# Patient Record
Sex: MALE | Race: WHITE | NOT HISPANIC OR LATINO | Employment: OTHER | URBAN - METROPOLITAN AREA
[De-identification: names, ages, dates, MRNs, and addresses within clinical notes are randomized per-mention and may not be internally consistent; named-entity substitution may affect disease eponyms.]

---

## 2018-07-05 ENCOUNTER — HOSPITAL ENCOUNTER (EMERGENCY)
Facility: HOSPITAL | Age: 63
Discharge: HOME | End: 2018-07-05
Attending: EMERGENCY MEDICINE
Payer: COMMERCIAL

## 2018-07-05 VITALS
TEMPERATURE: 98.7 F | SYSTOLIC BLOOD PRESSURE: 122 MMHG | BODY MASS INDEX: 27.99 KG/M2 | HEIGHT: 69 IN | DIASTOLIC BLOOD PRESSURE: 62 MMHG | WEIGHT: 189 LBS | HEART RATE: 61 BPM | OXYGEN SATURATION: 98 % | RESPIRATION RATE: 14 BRPM

## 2018-07-05 DIAGNOSIS — R31.21 ASYMPTOMATIC MICROSCOPIC HEMATURIA: ICD-10-CM

## 2018-07-05 DIAGNOSIS — N28.9 MILD RENAL INSUFFICIENCY: Primary | ICD-10-CM

## 2018-07-05 LAB
ANION GAP SERPL CALC-SCNC: 8 MEQ/L (ref 3–15)
BACTERIA URNS QL MICRO: ABNORMAL /UL
BILIRUB UR QL STRIP.AUTO: NEGATIVE MG/DL
BUN SERPL-MCNC: 23 MG/DL (ref 8–20)
CALCIUM SERPL-MCNC: 9.7 MG/DL (ref 8.9–10.3)
CHLORIDE SERPL-SCNC: 101 MMOL/L (ref 98–109)
CLARITY UR REFRACT.AUTO: CLEAR
CO2 SERPL-SCNC: 26 MMOL/L (ref 22–32)
COLOR UR AUTO: YELLOW
CREAT SERPL-MCNC: 1.7 MG/DL (ref 0.8–1.3)
GFR SERPL CREATININE-BSD FRML MDRD: 41 ML/MIN/1.73M*2
GLUCOSE SERPL-MCNC: 110 MG/DL (ref 70–99)
GLUCOSE UR STRIP.AUTO-MCNC: NEGATIVE MG/DL
HGB UR QL STRIP.AUTO: 2
HYALINE CASTS #/AREA URNS LPF: ABNORMAL /LPF
KETONES UR STRIP.AUTO-MCNC: NEGATIVE MG/DL
LEUKOCYTE ESTERASE UR QL STRIP.AUTO: NEGATIVE
NITRITE UR QL STRIP.AUTO: NEGATIVE
PH UR STRIP.AUTO: 7 [PH]
POTASSIUM SERPL-SCNC: 4.5 MMOL/L (ref 3.6–5.1)
PROT UR QL STRIP.AUTO: NEGATIVE
RBC #/AREA URNS HPF: ABNORMAL /HPF
SODIUM SERPL-SCNC: 135 MMOL/L (ref 136–144)
SP GR UR REFRACT.AUTO: 1.01
SQUAMOUS URNS QL MICRO: ABNORMAL /HPF
UROBILINOGEN UR STRIP-ACNC: 0.2 EU/DL
WBC #/AREA URNS HPF: ABNORMAL /HPF

## 2018-07-05 PROCEDURE — 80048 BASIC METABOLIC PNL TOTAL CA: CPT | Performed by: EMERGENCY MEDICINE

## 2018-07-05 PROCEDURE — 81001 URINALYSIS AUTO W/SCOPE: CPT | Performed by: EMERGENCY MEDICINE

## 2018-07-05 PROCEDURE — 36415 COLL VENOUS BLD VENIPUNCTURE: CPT | Performed by: EMERGENCY MEDICINE

## 2018-07-05 PROCEDURE — 99283 EMERGENCY DEPT VISIT LOW MDM: CPT

## 2018-07-05 RX ORDER — ATORVASTATIN CALCIUM 80 MG/1
TABLET, FILM COATED ORAL
Refills: 0 | COMMUNITY
Start: 2018-06-13 | End: 2020-10-29 | Stop reason: ALTCHOICE

## 2018-07-05 RX ORDER — PANTOPRAZOLE SODIUM 40 MG/1
TABLET, DELAYED RELEASE ORAL
Refills: 4 | COMMUNITY
Start: 2018-06-05 | End: 2020-10-29 | Stop reason: ALTCHOICE

## 2018-07-05 RX ORDER — QUINAPRIL 40 MG/1
TABLET ORAL EVERY MORNING
Refills: 0 | COMMUNITY
Start: 2018-06-13 | End: 2022-03-14 | Stop reason: ALTCHOICE

## 2018-07-05 RX ORDER — FENOFIBRATE 145 MG/1
TABLET, FILM COATED ORAL
Refills: 1 | COMMUNITY
Start: 2018-06-05 | End: 2020-10-29 | Stop reason: ALTCHOICE

## 2018-07-05 RX ORDER — LATANOPROST 50 UG/ML
1 SOLUTION/ DROPS OPHTHALMIC NIGHTLY
Refills: 5 | COMMUNITY
Start: 2018-06-18

## 2018-07-05 ASSESSMENT — ENCOUNTER SYMPTOMS
HEADACHES: 0
ARTHRALGIAS: 0
COUGH: 0
JOINT SWELLING: 0
DIFFICULTY URINATING: 0
DYSURIA: 0
CHILLS: 0
PALPITATIONS: 0
WEAKNESS: 0
ABDOMINAL PAIN: 0
VOMITING: 0
NAUSEA: 0
SORE THROAT: 0
SHORTNESS OF BREATH: 0
FEVER: 0
DIARRHEA: 0

## 2018-07-05 NOTE — DISCHARGE INSTRUCTIONS
Don't take quinapril until you follow up with your PMD and they decide whether you need to have a different BP medication.

## 2018-07-05 NOTE — ED PROVIDER NOTES
HPI   62 y.o. male presents to the ED for evaluation of abnormal labs.  Pt reports he was sent to the ED from primary care due to abnormal labs of his urine. States that the labs indicated that his kidneys may be irritated. Notes that he takes baby asa. Pt also controls his BP with medications.  C/o mild gum bleeding.   Currently pt feels fine in ER and is without any other complaints.       PCP:    Chief Complaint   Patient presents with   • Labs Only     patient sent here due to abnormal labs per primary care          History provided by:  Patient       Patient History     No past medical history on file.    No past surgical history on file.    No family history on file.    Social History   Substance Use Topics   • Smoking status: Never Smoker   • Smokeless tobacco: Never Used   • Alcohol use Yes      Comment: socially       Systems Reviewed from Nursing Triage:          Review of Systems     Review of Systems   Constitutional: Negative for chills and fever.   HENT: Negative for congestion, ear pain and sore throat.    Respiratory: Negative for cough and shortness of breath.    Cardiovascular: Negative for chest pain, palpitations and leg swelling.   Gastrointestinal: Negative for abdominal pain, diarrhea, nausea and vomiting.   Endocrine: Negative for polyuria.   Genitourinary: Negative for decreased urine volume, difficulty urinating and dysuria.   Musculoskeletal: Negative for arthralgias and joint swelling.   Skin: Negative for rash.   Neurological: Negative for weakness and headaches.        Physical Exam     ED Triage Vitals [07/05/18 1109]   Temp Heart Rate Resp BP SpO2   37.3 °C (99.1 °F) 77 18 122/64 99 %      Temp Source Heart Rate Source Patient Position BP Location FiO2 (%) (Set)   Oral Monitor Sitting Left upper arm --       Pulse Ox %: 99 % (07/05/18 1212)  Pulse Ox Interpretation: Normal (07/05/18 1212)          Patient Vitals for the past 24 hrs:   BP Temp Temp src Pulse Resp SpO2 Height  "Weight   07/05/18 1422 117/69 - - (!) 58 14 97 % - -   07/05/18 1325 113/70 - - (!) 58 18 100 % - -   07/05/18 1109 122/64 37.3 °C (99.1 °F) Oral 77 18 99 % 1.76 m (5' 9.29\") 85.7 kg (189 lb)           Physical Exam   Constitutional: He is oriented to person, place, and time. He appears well-developed and well-nourished. No distress.   HENT:   Head: Normocephalic and atraumatic.   Left Ear: External ear normal.   Eyes: EOM and lids are normal. Pupils are equal, round, and reactive to light.   Neck: Normal range of motion.   Cardiovascular: Normal rate, regular rhythm and normal heart sounds.    Pulmonary/Chest: Effort normal.   Abdominal: Soft. There is no tenderness.   Neurological: He is alert and oriented to person, place, and time. He has normal strength.   Skin: Skin is warm and dry.   Nursing note and vitals reviewed.           Procedures    ED Course & MDM     Labs Reviewed   BASIC METABOLIC PANEL - Abnormal        Result Value    Sodium 135 (*)     BUN 23 (*)     Creatinine 1.7 (*)     Glucose 110 (*)     eGFR 41.0 (*)     Potassium 4.5      Chloride 101      CO2 26      Calcium 9.7      Anion Gap 8     UA REFLEX CULTURE (MACROSCOPIC) - Abnormal     Blood, Urine +2 (*)     Color, Urine Yellow      Clarity, Urine Clear      Specific Gravity, Urine 1.008      pH, Urine 7.0      Leukocyte Esterase Negative      Nitrite, Urine Negative      Protein, Urine Negative      Glucose, Urine Negative      Ketones, Urine Negative      Urobilinogen, Urine 0.2      Bilirubin, Urine Negative     UA MICROSCOPIC - Abnormal     RBC, Urine 5 TO 9 (*)     WBC, Urine 0 TO 3      Squamous Epithelial None Seen      Hyaline Cast None Seen      Bacteria, Urine None Seen     URINALYSIS REFLEX CULTURE    Narrative:     The following orders were created for panel order Urinalysis w/ reflex culture.  Procedure                               Abnormality         Status                     ---------                               -----------  "        ------                     UA Reflex to Culture (Mac...[[id number]]  Abnormal            Final result               UA Microscopic[[id number]]                Abnormal            Final result                 Please view results for these tests on the individual orders.       No orders to display           MDM         ED Course as of Jul 05 1541   u Jul 05, 2018   1257 62 y.o. male presents to the ED for evaluation of abnormal labs.  Plans to check labs.   [HP]   1301 Elevated creatinine, no old labs available to compare. Pt is asymptomatic. Potassium normal. Will instruct pt to hold ace-I until follow up with PMD Creatinine: (!) 1.7 [HB]      ED Course User Index  [HB] Ena Santana DO  [HP] Natalie Macario         Clinical Impressions as of Jul 05 1541   Mild renal insufficiency   Asymptomatic microscopic hematuria   Scribe Attestation  By signing my name below, INatalie, attest that this documentation has been prepared under the direction and in the presence of Ena Santana DO.  7/5/2018 3:41 PM    Provider Attestation  IEna, personally performed the services described in this documentation, as documented by the scribe in my presence, and it is both accurate and complete.  7/5/2018 3:41 PM         Natalie Macario  07/05/18 1258       Natalie Macario  07/05/18 1258       Ena Santana DO  07/05/18 1541

## 2020-04-06 ENCOUNTER — APPOINTMENT (OUTPATIENT)
Dept: URBAN - METROPOLITAN AREA CLINIC 200 | Age: 65
Setting detail: DERMATOLOGY
End: 2020-04-10

## 2020-04-06 DIAGNOSIS — L20.84 INTRINSIC (ALLERGIC) ECZEMA: ICD-10-CM

## 2020-04-06 PROCEDURE — OTHER COUNSELING: OTHER

## 2020-04-06 PROCEDURE — 99202 OFFICE O/P NEW SF 15 MIN: CPT | Mod: 95

## 2020-04-06 PROCEDURE — OTHER PRESCRIPTION: OTHER

## 2020-04-06 RX ORDER — TRIAMCINOLONE ACETONIDE 1 MG/G
CREAM TOPICAL
Qty: 1 | Refills: 8 | Status: ERX | COMMUNITY
Start: 2020-04-06

## 2020-04-06 ASSESSMENT — LOCATION SIMPLE DESCRIPTION DERM
LOCATION SIMPLE: LEFT PRETIBIAL REGION
LOCATION SIMPLE: ABDOMEN
LOCATION SIMPLE: RIGHT PRETIBIAL REGION
LOCATION SIMPLE: UPPER BACK

## 2020-04-06 ASSESSMENT — LOCATION ZONE DERM
LOCATION ZONE: TRUNK
LOCATION ZONE: LEG

## 2020-04-06 ASSESSMENT — LOCATION DETAILED DESCRIPTION DERM
LOCATION DETAILED: RIGHT RIB CAGE
LOCATION DETAILED: RIGHT LATERAL ABDOMEN
LOCATION DETAILED: RIGHT DISTAL PRETIBIAL REGION
LOCATION DETAILED: INFERIOR THORACIC SPINE
LOCATION DETAILED: LEFT DISTAL PRETIBIAL REGION

## 2020-04-06 ASSESSMENT — SEVERITY ASSESSMENT 2020: SEVERITY 2020: MODERATE

## 2020-04-06 ASSESSMENT — BSA RASH: BSA RASH: 15

## 2020-04-06 NOTE — HPI: RASH
What Type Of Note Output Would You Prefer (Optional)?: Bullet Format
How Severe Is Your Rash?: moderate
Is This A New Presentation, Or A Follow-Up?: Rash
Additional History: Patient has allergic rhinitis, occurs primarily in winter, also has allergies to down

## 2020-04-06 NOTE — PROCEDURE: COUNSELING
Patient Specific Counseling (Will Not Stick From Patient To Patient): stop using Dial soap.  Switch to Dove, cetaphil, olay, caress, tone, diego.  Takes up to 3 showers daily.  Recommended 1 shorter cooler shower daily.
Detail Level: Zone

## 2020-08-06 ENCOUNTER — OFFICE VISIT (OUTPATIENT)
Dept: SURGERY | Facility: CLINIC | Age: 65
End: 2020-08-06
Payer: COMMERCIAL

## 2020-08-06 VITALS — BODY MASS INDEX: 26.77 KG/M2 | WEIGHT: 187 LBS | HEIGHT: 70 IN

## 2020-08-06 DIAGNOSIS — K40.90 REDUCIBLE RIGHT INGUINAL HERNIA: ICD-10-CM

## 2020-08-06 DIAGNOSIS — R10.32 LEFT GROIN PAIN: Primary | ICD-10-CM

## 2020-08-06 PROCEDURE — 99203 OFFICE O/P NEW LOW 30 MIN: CPT | Performed by: SURGERY

## 2020-08-06 RX ORDER — SIMVASTATIN 40 MG/1
50 TABLET, FILM COATED ORAL NIGHTLY
COMMUNITY
End: 2020-10-29 | Stop reason: ALTCHOICE

## 2020-08-06 ASSESSMENT — ENCOUNTER SYMPTOMS
PALPITATIONS: 0
HEMATURIA: 0
ARTHRALGIAS: 1
BRUISES/BLEEDS EASILY: 0
FEVER: 0
VOMITING: 0
SHORTNESS OF BREATH: 0
ABDOMINAL PAIN: 1
DYSURIA: 0
CONSTIPATION: 0
APPETITE CHANGE: 0
CHEST TIGHTNESS: 0
MYALGIAS: 0
BACK PAIN: 0
BLOOD IN STOOL: 0
WHEEZING: 0
NAUSEA: 0
DIARRHEA: 0
COUGH: 0
ABDOMINAL DISTENTION: 0
ACTIVITY CHANGE: 0

## 2020-08-06 NOTE — PROGRESS NOTES
"General Surgery H&P  Patient: Julian Vazquez  MRN: [id number]  1955    Visit Date: 8/6/2020  Encounter Provider: Issac Pablo  Referring Provider: Clara Yoo DO    Subjective   Consult and Hernia      Julian Vazquez is a 64 y.o. male who was seen in consultation at the request of referring physician for management recommendations.  Julian AVILA presents to the office with left inguinal discomfort directly in the left inguinal crease.  Julian has not noticed any left inguinal masses.  Julian denies other abdominal pain, nausea, vomiting, diarrhea, constipation, melena or blood per rectum.  He has no dysuria or hematuria, and denies back or flank pain.  Upon questioning Julian does admit to \"clicking\" in the left hip.  Julian denies cough, wheezing or shortness of breath and has no chest pain on exertion or palpitations.  He has no history of easy bruising or prolonged bleeding.  Julian has had no skin eruptions or rash.    Medical History:   Past Medical History:   Diagnosis Date   • Lipid disorder        Surgical History:   Past Surgical History:   Procedure Laterality Date   • HAND SURGERY         Social History:   Social History     Social History Narrative   • Not on file       Family History: History reviewed. No pertinent family history.    Allergies: Patient has no known allergies.    Current Medications:  •  quinapriL  •  simvastatin  •  atorvastatin  •  fenofibrate  •  latanoprost  •  pantoprazole    Review of Systems  Review of Systems   Constitutional: Negative for activity change, appetite change and fever.   HENT: Negative for nosebleeds.    Respiratory: Negative for cough, chest tightness, shortness of breath and wheezing.    Cardiovascular: Negative for chest pain and palpitations.   Gastrointestinal: Positive for abdominal pain. Negative for abdominal distention, blood in stool, constipation, diarrhea, nausea and vomiting.   Genitourinary: Negative for dysuria, hematuria, scrotal swelling " and testicular pain.   Musculoskeletal: Positive for arthralgias. Negative for back pain and myalgias.   Skin: Negative for pallor and rash.   Hematological: Does not bruise/bleed easily.       Objective     Physicial Exam  Physical Exam   Constitutional: He is oriented to person, place, and time. He appears well-developed and well-nourished. No distress.   Eyes: Conjunctivae are normal.   Pulmonary/Chest: Effort normal. No respiratory distress.   Abdominal: Soft. He exhibits no distension. There is no tenderness. There is no guarding. A hernia is present.   There is no palpable left inguinal hernia.  Julian does have a nontender, reducible right inguinal hernia.   Musculoskeletal: Normal range of motion. He exhibits no tenderness.   Neurological: He is alert and oriented to person, place, and time.   Skin: Skin is warm and dry. No rash noted. He is not diaphoretic. No erythema. No pallor.   Psychiatric: He has a normal mood and affect. His behavior is normal. Judgment and thought content normal.   Nursing note and vitals reviewed.        Labs  No results found for: WBC, HGB, HCT, MCV, PLT    Lab Results   Component Value Date    GLUCOSE 110 (H) 07/05/2018    CALCIUM 9.7 07/05/2018     (L) 07/05/2018    K 4.5 07/05/2018    CO2 26 07/05/2018     07/05/2018    BUN 23 (H) 07/05/2018    CREATININE 1.7 (H) 07/05/2018       No results found for: ALT, AST, GGT, ALKPHOS, BILITOT      Imaging  Not applicable    Assessment       Left groin pain  Reducible right inguinal hernia          Plan   I have asked Julian to have a CT of his abdomen and pelvis without p.o. or IV contrast.  I will call him with the report.  In addition, I have asked Julian to visit with orthopedic surgery and I have referred him to the [facility].  I did explain to Julian the procedure of repairing his right inguinal hernia, and bilateral inguinal hernia repair, preferably robotically.  He did sign an operative permit here today in the office.   No date has been scheduled.Julian AVILA expressed understanding of the indications for the procedure, the procedure which is planned, the alternatives, including doing nothing, and the risks and complications of each.  I did answer all of his questions to his satisfaction.        Issac Pablo MD

## 2020-08-19 ENCOUNTER — TELEPHONE (OUTPATIENT)
Dept: SURGERY | Facility: CLINIC | Age: 65
End: 2020-08-19

## 2020-08-19 DIAGNOSIS — R10.32 LEFT GROIN PAIN: Primary | ICD-10-CM

## 2020-08-19 DIAGNOSIS — K40.90 REDUCIBLE RIGHT INGUINAL HERNIA: ICD-10-CM

## 2020-08-19 NOTE — TELEPHONE ENCOUNTER
Left message for patient, Insurance denied CT stating not medically necessary, ultrasound of scrotum needs to be done prior for them to reconsider.

## 2020-08-27 ENCOUNTER — HOSPITAL ENCOUNTER (OUTPATIENT)
Dept: RADIOLOGY | Facility: HOSPITAL | Age: 65
Discharge: HOME | End: 2020-08-27
Attending: SURGERY
Payer: COMMERCIAL

## 2020-08-27 ENCOUNTER — TELEPHONE (OUTPATIENT)
Dept: SURGERY | Facility: CLINIC | Age: 65
End: 2020-08-27

## 2020-08-27 DIAGNOSIS — K40.90 REDUCIBLE RIGHT INGUINAL HERNIA: ICD-10-CM

## 2020-08-27 DIAGNOSIS — R10.32 LEFT GROIN PAIN: ICD-10-CM

## 2020-08-27 PROCEDURE — 76870 US EXAM SCROTUM: CPT

## 2020-08-27 NOTE — TELEPHONE ENCOUNTER
Left message for patient regarding if he had a chance to get ultrasound of scrotum done.  Insurance will not approve CT until ultrasound is done.

## 2020-09-01 ENCOUNTER — TELEPHONE (OUTPATIENT)
Dept: SURGERY | Facility: CLINIC | Age: 65
End: 2020-09-01

## 2020-09-01 NOTE — TELEPHONE ENCOUNTER
----- Message from Issac Pablo MD sent at 8/27/2020  3:59 PM EDT -----  Please send copy to PCP.  Please tell Julian that ultrasound of the scrotum is normal.  Please order the CT examination that I had ordered before.

## 2020-09-08 ENCOUNTER — HOSPITAL ENCOUNTER (OUTPATIENT)
Dept: RADIOLOGY | Facility: HOSPITAL | Age: 65
Discharge: HOME | End: 2020-09-08
Attending: SURGERY
Payer: COMMERCIAL

## 2020-09-08 DIAGNOSIS — R10.32 LEFT GROIN PAIN: ICD-10-CM

## 2020-09-08 DIAGNOSIS — K40.90 REDUCIBLE RIGHT INGUINAL HERNIA: ICD-10-CM

## 2020-09-08 PROCEDURE — 74176 CT ABD & PELVIS W/O CONTRAST: CPT

## 2020-09-09 ENCOUNTER — TELEPHONE (OUTPATIENT)
Dept: SURGERY | Facility: CLINIC | Age: 65
End: 2020-09-09

## 2020-09-09 NOTE — TELEPHONE ENCOUNTER
----- Message from Issac Pablo MD sent at 9/8/2020  6:08 PM EDT -----  Please send copy to PCP. Please tell Julian that he has bilateral inguinal hernias, we will repair at the same time. Please schedule accordingly      Called patient to let him know . Annette will be calling patient to schedule

## 2020-09-15 PROBLEM — K40.20 NON-RECURRENT BILATERAL INGUINAL HERNIA WITHOUT OBSTRUCTION OR GANGRENE: Status: ACTIVE | Noted: 2020-09-15

## 2020-10-29 RX ORDER — ATORVASTATIN CALCIUM 40 MG/1
40 TABLET, FILM COATED ORAL NIGHTLY
COMMUNITY
End: 2022-03-14 | Stop reason: ENTERED-IN-ERROR

## 2020-10-29 SDOH — HEALTH STABILITY: MENTAL HEALTH: HOW OFTEN DO YOU HAVE A DRINK CONTAINING ALCOHOL?: NEVER

## 2020-10-29 ASSESSMENT — PAIN SCALES - GENERAL: PAINLEVEL: 2

## 2020-11-04 ENCOUNTER — APPOINTMENT (OUTPATIENT)
Dept: PREADMISSION TESTING | Facility: HOSPITAL | Age: 65
End: 2020-11-04
Attending: SURGERY
Payer: MEDICARE

## 2020-11-04 ENCOUNTER — TELEPHONE (OUTPATIENT)
Dept: SURGERY | Facility: CLINIC | Age: 65
End: 2020-11-04

## 2020-11-04 VITALS
BODY MASS INDEX: 26.48 KG/M2 | HEART RATE: 64 BPM | WEIGHT: 185 LBS | OXYGEN SATURATION: 100 % | TEMPERATURE: 98.5 F | DIASTOLIC BLOOD PRESSURE: 71 MMHG | RESPIRATION RATE: 18 BRPM | SYSTOLIC BLOOD PRESSURE: 152 MMHG | HEIGHT: 70 IN

## 2020-11-04 DIAGNOSIS — Z01.818 PREOP TESTING: Primary | ICD-10-CM

## 2020-11-04 DIAGNOSIS — K40.20 NON-RECURRENT BILATERAL INGUINAL HERNIA WITHOUT OBSTRUCTION OR GANGRENE: ICD-10-CM

## 2020-11-04 LAB
ANION GAP SERPL CALC-SCNC: 11 MEQ/L (ref 3–15)
BASOPHILS # BLD: 0.05 K/UL (ref 0.01–0.1)
BASOPHILS NFR BLD: 1 %
BUN SERPL-MCNC: 12 MG/DL (ref 8–20)
CALCIUM SERPL-MCNC: 9.2 MG/DL (ref 8.9–10.3)
CHLORIDE SERPL-SCNC: 104 MEQ/L (ref 98–109)
CO2 SERPL-SCNC: 24 MEQ/L (ref 22–32)
CREAT SERPL-MCNC: 0.9 MG/DL (ref 0.8–1.3)
DIFFERENTIAL METHOD BLD: ABNORMAL
EOSINOPHIL # BLD: 0.12 K/UL (ref 0.04–0.54)
EOSINOPHIL NFR BLD: 2.4 %
ERYTHROCYTE [DISTWIDTH] IN BLOOD BY AUTOMATED COUNT: 12.1 % (ref 11.6–14.4)
GFR SERPL CREATININE-BSD FRML MDRD: >60 ML/MIN/1.73M*2
GLUCOSE SERPL-MCNC: 105 MG/DL (ref 70–99)
HCT VFR BLDCO AUTO: 42.7 % (ref 40.1–51)
HGB BLD-MCNC: 14.5 G/DL (ref 13.7–17.5)
IMM GRANULOCYTES # BLD AUTO: 0.01 K/UL (ref 0–0.08)
IMM GRANULOCYTES NFR BLD AUTO: 0.2 %
LYMPHOCYTES # BLD: 1.75 K/UL (ref 1.2–3.5)
LYMPHOCYTES NFR BLD: 34.9 %
MCH RBC QN AUTO: 32.8 PG (ref 28–33.2)
MCHC RBC AUTO-ENTMCNC: 34 G/DL (ref 32.2–36.5)
MCV RBC AUTO: 96.6 FL (ref 83–98)
MONOCYTES # BLD: 0.4 K/UL (ref 0.3–1)
MONOCYTES NFR BLD: 8 %
NEUTROPHILS # BLD: 2.69 K/UL (ref 1.7–7)
NEUTS SEG NFR BLD: 53.5 %
NRBC BLD-RTO: 0 %
PDW BLD AUTO: 11.1 FL (ref 9.4–12.4)
PLATELET # BLD AUTO: 242 K/UL (ref 150–350)
POTASSIUM SERPL-SCNC: 4.5 MEQ/L (ref 3.6–5.1)
RBC # BLD AUTO: 4.42 M/UL (ref 4.5–5.8)
SODIUM SERPL-SCNC: 139 MEQ/L (ref 136–144)
WBC # BLD AUTO: 5.02 K/UL (ref 3.8–10.5)

## 2020-11-04 PROCEDURE — 93005 ELECTROCARDIOGRAM TRACING: CPT

## 2020-11-04 PROCEDURE — 36415 COLL VENOUS BLD VENIPUNCTURE: CPT

## 2020-11-04 PROCEDURE — U0003 INFECTIOUS AGENT DETECTION BY NUCLEIC ACID (DNA OR RNA); SEVERE ACUTE RESPIRATORY SYNDROME CORONAVIRUS 2 (SARS-COV-2) (CORONAVIRUS DISEASE [COVID-19]), AMPLIFIED PROBE TECHNIQUE, MAKING USE OF HIGH THROUGHPUT TECHNOLOGIES AS DESCRIBED BY CMS-2020-01-R: HCPCS

## 2020-11-04 PROCEDURE — 85025 COMPLETE CBC W/AUTO DIFF WBC: CPT

## 2020-11-04 PROCEDURE — 80048 BASIC METABOLIC PNL TOTAL CA: CPT

## 2020-11-04 ASSESSMENT — PAIN SCALES - GENERAL: PAINLEVEL: 0-NO PAIN
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
## 2020-11-04 NOTE — PRE-PROCEDURE INSTRUCTIONS
1. We will call you between 3pm to 7pm 1 day before the date of your surgery to determine that arrival time for your procedure.   2. Please report to outpatient unit:2nd floor as directed on the day of your procedure.   3. Please follow the following fasting guidelines:   · No solids to eat or drink 8 hours prior to arrival. Clear liquids up to two hours prior to arrival-water and black coffee/tea;no sugar or cream   4. Early on the morning of the procedure please do not take medication  Please quarantine until surgery.  Please wear a cloth mask to enter the hospital   5. Other Instructions: body wash as directed   6. If you develop a cold, cough, fever, rash, or other symptom prior to the data of the procedure, please report it to your physician immediately.   7. If you need to cancel the procedure for any reason, please contact your physician or call the unit listed above.   8. Make arrangements to have someone drive you home from the procedure. If you have not arranged for transportation home, your surgery may be cancelled.    9. You may not take public transportation unless accompanied by a responsible person.   10. You may not drive a car or operate complex or potentially dangerous machinery for 24 hours following anesthesia and/or sedation.   11. If it is medically necessary for you to have a longer stay, you will be informed as soon as the decision is made.   12. Do not wear or being anything of value to the hospital including jewelry of any kind. Do not wear make-up or contact lenses. DO bring your glasses and hearing aid.   13. Dress in comfortable clothes.   14.  If instructed, please bring a copy of your Advanced Directive (Living Will/Durable Power of ) on the day of your procedure.      Pre operative instructions given as per protocol.  Form explained by:      I have read and understand the above information. I have had sufficient opportunity to ask questions I might have and they have been  answered to my satisfaction. I agree to comply with the Patient Responsibilities listed above and have received a copy of this form.

## 2020-11-05 ENCOUNTER — ANESTHESIA EVENT (OUTPATIENT)
Dept: OPERATING ROOM | Facility: HOSPITAL | Age: 65
Setting detail: HOSPITAL OUTPATIENT SURGERY
End: 2020-11-05
Payer: MEDICARE

## 2020-11-05 LAB
ATRIAL RATE: 63
P AXIS: 35
PR INTERVAL: 184
QRS DURATION: 98
QT INTERVAL: 456
QTC CALCULATION(BAZETT): 466
R AXIS: -65
T WAVE AXIS: 34
VENTRICULAR RATE: 63

## 2020-11-05 PROCEDURE — 93010 ELECTROCARDIOGRAM REPORT: CPT | Performed by: INTERNAL MEDICINE

## 2020-11-06 LAB — SARS-COV-2 RNA RESP QL NAA+PROBE: NOT DETECTED

## 2020-11-09 ENCOUNTER — HOSPITAL ENCOUNTER (OUTPATIENT)
Facility: HOSPITAL | Age: 65
Setting detail: HOSPITAL OUTPATIENT SURGERY
Discharge: HOME | End: 2020-11-09
Attending: SURGERY | Admitting: SURGERY
Payer: MEDICARE

## 2020-11-09 ENCOUNTER — ANESTHESIA (OUTPATIENT)
Dept: OPERATING ROOM | Facility: HOSPITAL | Age: 65
Setting detail: HOSPITAL OUTPATIENT SURGERY
End: 2020-11-09
Payer: MEDICARE

## 2020-11-09 VITALS
WEIGHT: 185 LBS | HEIGHT: 70 IN | HEART RATE: 68 BPM | SYSTOLIC BLOOD PRESSURE: 151 MMHG | OXYGEN SATURATION: 98 % | BODY MASS INDEX: 26.48 KG/M2 | RESPIRATION RATE: 16 BRPM | DIASTOLIC BLOOD PRESSURE: 62 MMHG | TEMPERATURE: 97.2 F

## 2020-11-09 PROCEDURE — 63600000 HC DRUGS/DETAIL CODE: Performed by: SURGERY

## 2020-11-09 PROCEDURE — 71000011 HC PACU PHASE 1 EA ADDL MIN: Performed by: SURGERY

## 2020-11-09 PROCEDURE — 0YU54JZ SUPPLEMENT RIGHT INGUINAL REGION WITH SYNTHETIC SUBSTITUTE, PERCUTANEOUS ENDOSCOPIC APPROACH: ICD-10-PCS | Performed by: SURGERY

## 2020-11-09 PROCEDURE — 49650 LAP ING HERNIA REPAIR INIT: CPT | Mod: RT | Performed by: SURGERY

## 2020-11-09 PROCEDURE — 8E0W4CZ ROBOTIC ASSISTED PROCEDURE OF TRUNK REGION, PERCUTANEOUS ENDOSCOPIC APPROACH: ICD-10-PCS | Performed by: SURGERY

## 2020-11-09 PROCEDURE — 71000012 HC PACU PHASE 2 EA ADDL MIN: Performed by: SURGERY

## 2020-11-09 PROCEDURE — C1781 MESH (IMPLANTABLE): HCPCS | Performed by: SURGERY

## 2020-11-09 PROCEDURE — 36000006 HC OR LEVEL 6 INITIAL 30MIN: Performed by: SURGERY

## 2020-11-09 PROCEDURE — 27200000 HC STERILE SUPPLY: Performed by: SURGERY

## 2020-11-09 PROCEDURE — 37000001 HC ANESTHESIA GENERAL: Performed by: SURGERY

## 2020-11-09 PROCEDURE — 71000001 HC PACU PHASE 1 INITIAL 30MIN: Performed by: SURGERY

## 2020-11-09 PROCEDURE — 200200 PR NO CHARGE: Performed by: SURGERY

## 2020-11-09 PROCEDURE — 25000000 HC PHARMACY GENERAL: Performed by: SURGERY

## 2020-11-09 PROCEDURE — 63600000 HC DRUGS/DETAIL CODE: Mod: JW | Performed by: NURSE ANESTHETIST, CERTIFIED REGISTERED

## 2020-11-09 PROCEDURE — 71000002 HC PACU PHASE 2 INITIAL 30MIN: Performed by: SURGERY

## 2020-11-09 PROCEDURE — 25000000 HC PHARMACY GENERAL: Performed by: NURSE ANESTHETIST, CERTIFIED REGISTERED

## 2020-11-09 PROCEDURE — 36000016 HC OR LEVEL 6 EA ADDL MIN: Performed by: SURGERY

## 2020-11-09 DEVICE — LAPROSCOPIC PROGRIP RIGHT MESH 10X15: Type: IMPLANTABLE DEVICE | Site: INGUINAL | Status: FUNCTIONAL

## 2020-11-09 RX ORDER — PROPOFOL 10 MG/ML
INJECTION, EMULSION INTRAVENOUS AS NEEDED
Status: DISCONTINUED | OUTPATIENT
Start: 2020-11-09 | End: 2020-11-09 | Stop reason: SURG

## 2020-11-09 RX ORDER — ACETAMINOPHEN 325 MG/1
650 TABLET ORAL ONCE AS NEEDED
Status: CANCELLED | OUTPATIENT
Start: 2020-11-09

## 2020-11-09 RX ORDER — DEXAMETHASONE SODIUM PHOSPHATE 4 MG/ML
INJECTION, SOLUTION INTRA-ARTICULAR; INTRALESIONAL; INTRAMUSCULAR; INTRAVENOUS; SOFT TISSUE AS NEEDED
Status: DISCONTINUED | OUTPATIENT
Start: 2020-11-09 | End: 2020-11-09 | Stop reason: SURG

## 2020-11-09 RX ORDER — SODIUM CHLORIDE, SODIUM LACTATE, POTASSIUM CHLORIDE, CALCIUM CHLORIDE 600; 310; 30; 20 MG/100ML; MG/100ML; MG/100ML; MG/100ML
INJECTION, SOLUTION INTRAVENOUS CONTINUOUS
Status: CANCELLED | OUTPATIENT
Start: 2020-11-09 | End: 2020-11-09

## 2020-11-09 RX ORDER — ONDANSETRON HYDROCHLORIDE 2 MG/ML
4 INJECTION, SOLUTION INTRAVENOUS
Status: CANCELLED | OUTPATIENT
Start: 2020-11-09

## 2020-11-09 RX ORDER — OXYCODONE AND ACETAMINOPHEN 5; 325 MG/1; MG/1
1 TABLET ORAL EVERY 4 HOURS PRN
Qty: 6 TABLET | Refills: 0 | Status: SHIPPED | OUTPATIENT
Start: 2020-11-09 | End: 2022-03-14 | Stop reason: ALTCHOICE

## 2020-11-09 RX ORDER — HYDROMORPHONE HYDROCHLORIDE 1 MG/ML
0.5 INJECTION, SOLUTION INTRAMUSCULAR; INTRAVENOUS; SUBCUTANEOUS
Status: CANCELLED | OUTPATIENT
Start: 2020-11-09

## 2020-11-09 RX ORDER — ONDANSETRON HYDROCHLORIDE 2 MG/ML
INJECTION, SOLUTION INTRAVENOUS AS NEEDED
Status: DISCONTINUED | OUTPATIENT
Start: 2020-11-09 | End: 2020-11-09 | Stop reason: SURG

## 2020-11-09 RX ORDER — LIDOCAINE HCL/PF 100 MG/5ML
SYRINGE (ML) INTRAVENOUS AS NEEDED
Status: DISCONTINUED | OUTPATIENT
Start: 2020-11-09 | End: 2020-11-09 | Stop reason: SURG

## 2020-11-09 RX ORDER — CEFAZOLIN SODIUM/WATER 2 G/20 ML
2 SYRINGE (ML) INTRAVENOUS
Status: COMPLETED | OUTPATIENT
Start: 2020-11-09 | End: 2020-11-09

## 2020-11-09 RX ORDER — KETOROLAC TROMETHAMINE 30 MG/ML
INJECTION, SOLUTION INTRAMUSCULAR; INTRAVENOUS AS NEEDED
Status: DISCONTINUED | OUTPATIENT
Start: 2020-11-09 | End: 2020-11-09 | Stop reason: SURG

## 2020-11-09 RX ORDER — OXYCODONE HYDROCHLORIDE 5 MG/1
5 TABLET ORAL ONCE AS NEEDED
Status: CANCELLED | OUTPATIENT
Start: 2020-11-09

## 2020-11-09 RX ORDER — BUPIVACAINE HYDROCHLORIDE 2.5 MG/ML
INJECTION, SOLUTION EPIDURAL; INFILTRATION; INTRACAUDAL AS NEEDED
Status: DISCONTINUED | OUTPATIENT
Start: 2020-11-09 | End: 2020-11-09 | Stop reason: HOSPADM

## 2020-11-09 RX ORDER — SODIUM CHLORIDE, SODIUM LACTATE, POTASSIUM CHLORIDE, CALCIUM CHLORIDE 600; 310; 30; 20 MG/100ML; MG/100ML; MG/100ML; MG/100ML
INJECTION, SOLUTION INTRAVENOUS CONTINUOUS PRN
Status: DISCONTINUED | OUTPATIENT
Start: 2020-11-09 | End: 2020-11-09 | Stop reason: SURG

## 2020-11-09 RX ORDER — FENTANYL CITRATE 50 UG/ML
50 INJECTION, SOLUTION INTRAMUSCULAR; INTRAVENOUS
Status: CANCELLED | OUTPATIENT
Start: 2020-11-09

## 2020-11-09 RX ORDER — SODIUM CHLORIDE, SODIUM LACTATE, POTASSIUM CHLORIDE, CALCIUM CHLORIDE 600; 310; 30; 20 MG/100ML; MG/100ML; MG/100ML; MG/100ML
INJECTION, SOLUTION INTRAVENOUS CONTINUOUS
Status: DISCONTINUED | OUTPATIENT
Start: 2020-11-09 | End: 2020-11-09 | Stop reason: HOSPADM

## 2020-11-09 RX ORDER — MIDAZOLAM HYDROCHLORIDE 2 MG/2ML
INJECTION, SOLUTION INTRAMUSCULAR; INTRAVENOUS AS NEEDED
Status: DISCONTINUED | OUTPATIENT
Start: 2020-11-09 | End: 2020-11-09 | Stop reason: SURG

## 2020-11-09 RX ORDER — FENTANYL CITRATE 50 UG/ML
INJECTION, SOLUTION INTRAMUSCULAR; INTRAVENOUS AS NEEDED
Status: DISCONTINUED | OUTPATIENT
Start: 2020-11-09 | End: 2020-11-09 | Stop reason: SURG

## 2020-11-09 RX ORDER — SODIUM CHLORIDE 9 MG/ML
125 INJECTION, SOLUTION INTRAVENOUS CONTINUOUS
Status: DISCONTINUED | OUTPATIENT
Start: 2020-11-09 | End: 2020-11-09 | Stop reason: HOSPADM

## 2020-11-09 RX ORDER — ROCURONIUM BROMIDE 10 MG/ML
INJECTION, SOLUTION INTRAVENOUS AS NEEDED
Status: DISCONTINUED | OUTPATIENT
Start: 2020-11-09 | End: 2020-11-09 | Stop reason: SURG

## 2020-11-09 RX ADMIN — ROCURONIUM BROMIDE 10 MG: 10 INJECTION, SOLUTION INTRAVENOUS at 11:16

## 2020-11-09 RX ADMIN — ONDANSETRON HYDROCHLORIDE 4 MG: 2 INJECTION, SOLUTION INTRAMUSCULAR; INTRAVENOUS at 10:15

## 2020-11-09 RX ADMIN — ROCURONIUM BROMIDE 50 MG: 10 INJECTION, SOLUTION INTRAVENOUS at 10:02

## 2020-11-09 RX ADMIN — SUGAMMADEX 340 MG: 100 INJECTION, SOLUTION INTRAVENOUS at 11:32

## 2020-11-09 RX ADMIN — LIDOCAINE HYDROCHLORIDE 5 ML: 20 INJECTION, SOLUTION INTRAVENOUS at 10:02

## 2020-11-09 RX ADMIN — Medication 2 G: at 10:06

## 2020-11-09 RX ADMIN — ROCURONIUM BROMIDE 20 MG: 10 INJECTION, SOLUTION INTRAVENOUS at 10:21

## 2020-11-09 RX ADMIN — DEXAMETHASONE SODIUM PHOSPHATE 4 MG: 4 INJECTION, SOLUTION INTRA-ARTICULAR; INTRALESIONAL; INTRAMUSCULAR; INTRAVENOUS; SOFT TISSUE at 10:15

## 2020-11-09 RX ADMIN — ROCURONIUM BROMIDE 20 MG: 10 INJECTION, SOLUTION INTRAVENOUS at 10:45

## 2020-11-09 RX ADMIN — SODIUM CHLORIDE, SODIUM LACTATE, POTASSIUM CHLORIDE, CALCIUM CHLORIDE: 600; 310; 30; 20 INJECTION, SOLUTION INTRAVENOUS at 09:58

## 2020-11-09 RX ADMIN — MIDAZOLAM HYDROCHLORIDE 2 MG: 1 INJECTION, SOLUTION INTRAMUSCULAR; INTRAVENOUS at 09:53

## 2020-11-09 RX ADMIN — FENTANYL CITRATE 100 MCG: 50 INJECTION, SOLUTION INTRAMUSCULAR; INTRAVENOUS at 10:36

## 2020-11-09 RX ADMIN — SODIUM CHLORIDE, POTASSIUM CHLORIDE, SODIUM LACTATE AND CALCIUM CHLORIDE: 600; 310; 30; 20 INJECTION, SOLUTION INTRAVENOUS at 09:22

## 2020-11-09 RX ADMIN — PROPOFOL INJECTABLE EMULSION 200 MG: 10 INJECTION, EMULSION INTRAVENOUS at 10:02

## 2020-11-09 RX ADMIN — FENTANYL CITRATE 150 MCG: 50 INJECTION, SOLUTION INTRAMUSCULAR; INTRAVENOUS at 10:02

## 2020-11-09 RX ADMIN — KETOROLAC TROMETHAMINE 30 MG: 30 INJECTION, SOLUTION INTRAMUSCULAR at 11:30

## 2020-11-09 ASSESSMENT — PAIN SCALES - GENERAL: PAIN_LEVEL: 2

## 2020-11-09 ASSESSMENT — PAIN - FUNCTIONAL ASSESSMENT: PAIN_FUNCTIONAL_ASSESSMENT: NO/DENIES PAIN

## 2020-11-09 NOTE — ANESTHESIOLOGIST PRE-PROCEDURE ATTESTATION
Pre-Procedure Patient Identification:  I am the Primary Anesthesiologist and have identified the patient on 11/09/20 at 9:31 AM.   I have confirmed the following procedure(s) HERNIA INGUINAL LAPAROSCOPIC ROBOTIC (B) will be performed by the following surgeon/proceduralist Issac Pablo MD.

## 2020-11-09 NOTE — ANESTHESIA PROCEDURE NOTES
Airway  Urgency: elective    Start Time: 11/9/2020 10:05 AM  Stop Time: 11/9/2020 11:47 AM    Airway not difficult    General Information and Staff    Patient location during procedure: OR  Anesthesiologist: Gaurav Holt MD  Resident/CRNA: Judit Soria CRNA  Performed: resident/CRNA     Indications and Patient Condition  Indications for airway management: anesthesia  Sedation level: general  Preoxygenated: yes  Patient position: sniffing  Mask difficulty assessment: 1 - vent by mask    Final Airway Details  Final airway type: endotracheal airway      Successful airway: ETT  Cuffed: yes   Successful intubation technique: direct laryngoscopy  Facilitating devices/methods: intubating stylet  Endotracheal tube insertion site: oral  Blade: Haley  Blade size: #4  ETT size (mm): 8.0  Cormack-Lehane Classification: grade I - full view of glottis  Placement verified by: chest auscultation and capnometry   Measured from: lips  ETT to lips (cm): 24  Number of attempts at approach: 1  Number of other approaches attempted: 0  Atraumatic airway insertion

## 2020-11-09 NOTE — OP NOTE
PreOpDx: Pre-op Diagnosis     * Non-recurrent bilateral inguinal hernia without obstruction or gangrene [K40.20]  PostOp Dx: Post-op Diagnosis     Reducible direct and indirect right inguinal hernia  Procedure: Procedure(s):  ROBOTIC REPAIR OF THE RIGHT DIRECT AND INDIRECT INGUINAL HERNIAS - Wound Class: Clean - Incision Closure: Deep and Superficial Layers  Surgeon: Surgeon(s) and Role:     * Issac Pablo MD - Primary  Assistant: MAYUR Schuler  Anesthesia: General  EBL: minimal  Specimen: *None  Complications: none  To PACU in satisfactory condition    Procedure Description:    Julian and PETER marked the operative sites, and reviewed the operation to be performed, preoperatively in the holding area before sedation was administered. Julian did receive intravenous antibiotics preoperatively.  Once upon the operating table a timeout was held and Julian was identified, as were the operative sites and the operation to be performed.  The abdomen was prepped and draped in the usual sterile fashion using ChloraPrep solution.  An 8 mm incision was made in the midline superior to the umbilicus and the anterior abdominal wall elevated.  A Veress needle was inserted into the correct position on the first attempt.  Aspiration from the Veress needle revealed no gas or fluid, and a saline drop test revealed the Veress needle to be in good position.  A pneumoperitoneum was established without difficulty through this Veress needle to 15 mmHg, opening pressure being 4 mmHg. the Veress needle was removed and an 8 mm port and laparoscope were passed through this incision.  Under direct vision an 8 mm port was placed on the right side of the abdominal wall, and another 8 mm port was placed on the left side of the abdominal wall.  The da Anshu X robot was docked.  There were direct and indirect right inguinal hernias.  There was no left inguinal hernia visible.  The right peritoneum was incised and a peritoneal reflection was created.  The right Samuel's ligament was exposed, and dissection was also carried out lateral to the right cord, care being taken not to injure nerves.  The right hernias and hernia sacs were completely reduced.  There was good hemostasis.  A 15 cm x 10 cm pro- mesh was then placed into the correct position so that it covered the right direct ring, the right indirect ring, as well as the right femoral canal.  Tisseel fibrin glue was then sprayed on the mesh.  The peritoneum was reapproximated using a running 2-0 strata fix suture.  There was no exposure of the bowel to the mesh.  The instruments were removed, the robot undocked, and the ports removed. The skin of all of the incisions was closed using running 4-0 Monocryl subcuticular suture.  Dermabond was applied.  At the conclusion of the case the sponge and instrument counts were correct x2. There were no complications, Julian tolerated the procedure well and returned to the PACU in satisfactory condition.    Issac Pablo MD  Phone Number: [phone]  11/9/2020  11:48 AM

## 2020-11-09 NOTE — ANESTHESIA POSTPROCEDURE EVALUATION
Patient: Julian Vazquez    Procedure Summary     Date: 11/09/20 Room / Location:  OR 1 / [facility] OR    Anesthesia Start: 0953 Anesthesia Stop: 1158    Procedure: ROBOTIC REPAIR OF THE RIGHT DIRECT AND INDIRECT INGUINAL HERNIA (Right Abdomen) Diagnosis:       Non-recurrent bilateral inguinal hernia without obstruction or gangrene      (Non-recurrent bilateral inguinal hernia without obstruction or gangrene [K40.20])    Surgeon: Issac Pablo MD Responsible Provider: Gaurav Holt MD    Anesthesia Type: Not recorded ASA Status: Not recorded          Anesthesia Type: No value filed.  PACU Vitals  11/9/2020 1152 - 11/9/2020 1252      11/9/2020  1200 11/9/2020  1210 11/9/2020  1215 11/9/2020  1220    BP:  (!) 143/71  (!) 147/71  (!) 144/68  (!) 144/68    Temp:  36.2 °C (97.2 °F)  --  --  --    Pulse:  76  66  65  66    Resp:  15  (!) 6  (!) 4  (!) 0    SpO2:  100 %  --  100 %  --              11/9/2020  1230 11/9/2020  1252          BP:  (!) 150/62  (!) 149/69      Temp:  --  --      Pulse:  67  64      Resp:  16  16      SpO2:  97 %  98 %              Anesthesia Post Evaluation    Pain score: 2  Pain management: satisfactory to patient  Mode of pain management: IV medication  Patient location during evaluation: PACU  Patient participation: complete - patient participated  Level of consciousness: awake and alert  Cardiovascular status: acceptable  Airway Patency: adequate  Respiratory status: acceptable  Hydration status: stable  Anesthetic complications: no
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
## 2020-11-09 NOTE — OR SURGEON
Pre-Procedure patient identification:  I am the primary operating surgeon/proceduralist and I have identified the patient on 11/09/20 at 9:20 AM Issac Pablo MD  Phone Number: [phone]

## 2021-08-16 ENCOUNTER — HOSPITAL ENCOUNTER (OUTPATIENT)
Dept: CARDIOLOGY | Facility: HOSPITAL | Age: 66
Discharge: HOME | End: 2021-08-16
Attending: OPHTHALMOLOGY
Payer: MEDICARE

## 2021-08-16 ENCOUNTER — TRANSCRIBE ORDERS (OUTPATIENT)
Dept: REGISTRATION | Facility: HOSPITAL | Age: 66
End: 2021-08-16

## 2021-08-16 DIAGNOSIS — Z01.810 ENCOUNTER FOR PREPROCEDURAL CARDIOVASCULAR EXAMINATION: ICD-10-CM

## 2021-08-16 DIAGNOSIS — Z01.810 ENCOUNTER FOR PREPROCEDURAL CARDIOVASCULAR EXAMINATION: Primary | ICD-10-CM

## 2021-08-16 PROCEDURE — 93005 ELECTROCARDIOGRAM TRACING: CPT

## 2021-08-17 LAB
ATRIAL RATE: 65
P AXIS: 39
PR INTERVAL: 180
QRS DURATION: 118
QT INTERVAL: 444
QTC CALCULATION(BAZETT): 461
R AXIS: -60
T WAVE AXIS: 56
VENTRICULAR RATE: 65

## 2022-03-12 ENCOUNTER — HOSPITAL ENCOUNTER (OUTPATIENT)
Facility: CLINIC | Age: 67
Discharge: HOME | End: 2022-03-12
Attending: FAMILY MEDICINE
Payer: MEDICARE

## 2022-03-12 ENCOUNTER — APPOINTMENT (OUTPATIENT)
Dept: RADIOLOGY | Age: 67
End: 2022-03-12
Attending: FAMILY MEDICINE
Payer: MEDICARE

## 2022-03-12 VITALS
OXYGEN SATURATION: 91 % | TEMPERATURE: 100.2 F | DIASTOLIC BLOOD PRESSURE: 82 MMHG | SYSTOLIC BLOOD PRESSURE: 152 MMHG | HEART RATE: 77 BPM

## 2022-03-12 DIAGNOSIS — J18.9 PNEUMONIA OF RIGHT LUNG DUE TO INFECTIOUS ORGANISM, UNSPECIFIED PART OF LUNG: Primary | ICD-10-CM

## 2022-03-12 LAB
EXPIRATION DATE: 0
Lab: 0
POCT MANUFACTURER: 0
RAPID INFLUENZA A AGN: NEGATIVE
RAPID INFLUENZA B AGN: NEGATIVE

## 2022-03-12 PROCEDURE — 87804 INFLUENZA ASSAY W/OPTIC: CPT | Mod: QW | Performed by: FAMILY MEDICINE

## 2022-03-12 PROCEDURE — 71046 X-RAY EXAM CHEST 2 VIEWS: CPT | Performed by: FAMILY MEDICINE

## 2022-03-12 PROCEDURE — 99213 OFFICE O/P EST LOW 20 MIN: CPT | Performed by: FAMILY MEDICINE

## 2022-03-12 RX ORDER — DOXYCYCLINE 100 MG/1
100 CAPSULE ORAL 2 TIMES DAILY
Qty: 20 CAPSULE | Refills: 0 | Status: SHIPPED | OUTPATIENT
Start: 2022-03-12 | End: 2022-03-15 | Stop reason: HOSPADM

## 2022-03-12 NOTE — DISCHARGE INSTRUCTIONS
Continue augmentin  Start and complete doxycycline  Follow up in ED if any worsening  Follow up with PCP in 2-3 days if not improving
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
## 2022-03-14 ENCOUNTER — HOSPITAL ENCOUNTER (INPATIENT)
Facility: HOSPITAL | Age: 67
LOS: 1 days | Discharge: HOME | DRG: 195 | End: 2022-03-15
Attending: EMERGENCY MEDICINE | Admitting: HOSPITALIST
Payer: MEDICARE

## 2022-03-14 ENCOUNTER — APPOINTMENT (EMERGENCY)
Dept: RADIOLOGY | Facility: HOSPITAL | Age: 67
DRG: 195 | End: 2022-03-14
Attending: EMERGENCY MEDICINE
Payer: MEDICARE

## 2022-03-14 ENCOUNTER — APPOINTMENT (EMERGENCY)
Dept: RADIOLOGY | Facility: HOSPITAL | Age: 67
DRG: 195 | End: 2022-03-14
Payer: MEDICARE

## 2022-03-14 DIAGNOSIS — J18.9 MULTIFOCAL PNEUMONIA: Primary | ICD-10-CM

## 2022-03-14 PROBLEM — I10 HTN (HYPERTENSION): Status: ACTIVE | Noted: 2022-03-14

## 2022-03-14 PROBLEM — E78.5 DYSLIPIDEMIA: Status: ACTIVE | Noted: 2022-03-14

## 2022-03-14 LAB
ALBUMIN SERPL-MCNC: 3.2 G/DL (ref 3.4–5)
ALP SERPL-CCNC: 98 IU/L (ref 35–126)
ALT SERPL-CCNC: 24 IU/L (ref 16–63)
ANION GAP SERPL CALC-SCNC: 12 MEQ/L (ref 3–15)
AST SERPL-CCNC: 23 IU/L (ref 15–41)
BASOPHILS # BLD: 0.06 K/UL (ref 0.01–0.1)
BASOPHILS NFR BLD: 0.4 %
BILIRUB SERPL-MCNC: 0.6 MG/DL (ref 0.3–1.2)
BNP SERPL-MCNC: 37 PG/ML
BUN SERPL-MCNC: 10 MG/DL (ref 8–20)
CALCIUM SERPL-MCNC: 8.9 MG/DL (ref 8.9–10.3)
CHLORIDE SERPL-SCNC: 100 MEQ/L (ref 98–109)
CO2 SERPL-SCNC: 24 MEQ/L (ref 22–32)
CREAT SERPL-MCNC: 1 MG/DL (ref 0.8–1.3)
DIFFERENTIAL METHOD BLD: ABNORMAL
EOSINOPHIL # BLD: 0.29 K/UL (ref 0.04–0.54)
EOSINOPHIL NFR BLD: 2 %
ERYTHROCYTE [DISTWIDTH] IN BLOOD BY AUTOMATED COUNT: 13 % (ref 11.6–14.4)
FLUAV RNA SPEC QL NAA+PROBE: NEGATIVE
FLUBV RNA SPEC QL NAA+PROBE: NEGATIVE
GFR SERPL CREATININE-BSD FRML MDRD: >60 ML/MIN/1.73M*2
GLUCOSE SERPL-MCNC: 114 MG/DL (ref 70–99)
HCT VFR BLDCO AUTO: 40.7 % (ref 40.1–51)
HGB BLD-MCNC: 13.1 G/DL (ref 13.7–17.5)
IMM GRANULOCYTES # BLD AUTO: 0.11 K/UL (ref 0–0.08)
IMM GRANULOCYTES NFR BLD AUTO: 0.8 %
LYMPHOCYTES # BLD: 1.99 K/UL (ref 1.2–3.5)
LYMPHOCYTES NFR BLD: 14 %
MCH RBC QN AUTO: 31.6 PG (ref 28–33.2)
MCHC RBC AUTO-ENTMCNC: 32.2 G/DL (ref 32.2–36.5)
MCV RBC AUTO: 98.1 FL (ref 83–98)
MONOCYTES # BLD: 0.72 K/UL (ref 0.3–1)
MONOCYTES NFR BLD: 5.1 %
NEUTROPHILS # BLD: 11.05 K/UL (ref 1.7–7)
NEUTS SEG NFR BLD: 77.7 %
NRBC BLD-RTO: 0 %
PDW BLD AUTO: 9.6 FL (ref 9.4–12.4)
PLATELET # BLD AUTO: 381 K/UL (ref 150–350)
POTASSIUM SERPL-SCNC: 4.2 MEQ/L (ref 3.6–5.1)
PROT SERPL-MCNC: 7.3 G/DL (ref 6–8.2)
RBC # BLD AUTO: 4.15 M/UL (ref 4.5–5.8)
RSV RNA SPEC QL NAA+PROBE: NEGATIVE
SARS-COV-2 RNA RESP QL NAA+PROBE: NEGATIVE
SODIUM SERPL-SCNC: 136 MEQ/L (ref 136–144)
TROPONIN I SERPL HS-MCNC: 6.4 PG/ML
TROPONIN I SERPL HS-MCNC: 6.5 PG/ML
WBC # BLD AUTO: 14.22 K/UL (ref 3.8–10.5)

## 2022-03-14 PROCEDURE — 84484 ASSAY OF TROPONIN QUANT: CPT

## 2022-03-14 PROCEDURE — 63600000 HC DRUGS/DETAIL CODE: Performed by: PHYSICIAN ASSISTANT

## 2022-03-14 PROCEDURE — 1123F ACP DISCUSS/DSCN MKR DOCD: CPT | Performed by: HOSPITALIST

## 2022-03-14 PROCEDURE — 87040 BLOOD CULTURE FOR BACTERIA: CPT | Mod: 59 | Performed by: PHYSICIAN ASSISTANT

## 2022-03-14 PROCEDURE — 93005 ELECTROCARDIOGRAM TRACING: CPT | Performed by: EMERGENCY MEDICINE

## 2022-03-14 PROCEDURE — 96374 THER/PROPH/DIAG INJ IV PUSH: CPT | Mod: 59

## 2022-03-14 PROCEDURE — 80053 COMPREHEN METABOLIC PANEL: CPT | Performed by: EMERGENCY MEDICINE

## 2022-03-14 PROCEDURE — 20600000 HC ROOM AND CARE INTERMEDIATE/TELEMETRY

## 2022-03-14 PROCEDURE — 93005 ELECTROCARDIOGRAM TRACING: CPT

## 2022-03-14 PROCEDURE — 99285 EMERGENCY DEPT VISIT HI MDM: CPT | Mod: 25

## 2022-03-14 PROCEDURE — 84484 ASSAY OF TROPONIN QUANT: CPT | Mod: 91 | Performed by: EMERGENCY MEDICINE

## 2022-03-14 PROCEDURE — 63600105 HC IODINE BASED CONTRAST: Performed by: PHYSICIAN ASSISTANT

## 2022-03-14 PROCEDURE — 87633 RESP VIRUS 12-25 TARGETS: CPT | Performed by: INTERNAL MEDICINE

## 2022-03-14 PROCEDURE — 85025 COMPLETE CBC W/AUTO DIFF WBC: CPT

## 2022-03-14 PROCEDURE — 84484 ASSAY OF TROPONIN QUANT: CPT | Performed by: EMERGENCY MEDICINE

## 2022-03-14 PROCEDURE — 87637 SARSCOV2&INF A&B&RSV AMP PRB: CPT

## 2022-03-14 PROCEDURE — 36415 COLL VENOUS BLD VENIPUNCTURE: CPT

## 2022-03-14 PROCEDURE — 87637 SARSCOV2&INF A&B&RSV AMP PRB: CPT | Performed by: EMERGENCY MEDICINE

## 2022-03-14 PROCEDURE — 85025 COMPLETE CBC W/AUTO DIFF WBC: CPT | Performed by: EMERGENCY MEDICINE

## 2022-03-14 PROCEDURE — 71046 X-RAY EXAM CHEST 2 VIEWS: CPT

## 2022-03-14 PROCEDURE — 99223 1ST HOSP IP/OBS HIGH 75: CPT | Performed by: HOSPITALIST

## 2022-03-14 PROCEDURE — 63600000 HC DRUGS/DETAIL CODE: Performed by: HOSPITALIST

## 2022-03-14 PROCEDURE — 63700000 HC SELF-ADMINISTRABLE DRUG: Performed by: HOSPITALIST

## 2022-03-14 PROCEDURE — 83880 ASSAY OF NATRIURETIC PEPTIDE: CPT | Performed by: EMERGENCY MEDICINE

## 2022-03-14 PROCEDURE — G1004 CDSM NDSC: HCPCS

## 2022-03-14 RX ORDER — DEXTROSE 50 % IN WATER (D50W) INTRAVENOUS SYRINGE
25 AS NEEDED
Status: DISCONTINUED | OUTPATIENT
Start: 2022-03-14 | End: 2022-03-15 | Stop reason: HOSPADM

## 2022-03-14 RX ORDER — AMLODIPINE BESYLATE 5 MG/1
5 TABLET ORAL DAILY
Status: DISCONTINUED | OUTPATIENT
Start: 2022-03-15 | End: 2022-03-15 | Stop reason: HOSPADM

## 2022-03-14 RX ORDER — ATORVASTATIN CALCIUM 40 MG/1
40 TABLET, FILM COATED ORAL NIGHTLY
Status: CANCELLED | OUTPATIENT
Start: 2022-03-14

## 2022-03-14 RX ORDER — COLESTIPOL HYDROCHLORIDE 1 G/1
1 TABLET ORAL DAILY
COMMUNITY
Start: 2022-01-10

## 2022-03-14 RX ORDER — IBUPROFEN 200 MG
16-32 TABLET ORAL AS NEEDED
Status: DISCONTINUED | OUTPATIENT
Start: 2022-03-14 | End: 2022-03-15 | Stop reason: HOSPADM

## 2022-03-14 RX ORDER — LATANOPROST 50 UG/ML
1 SOLUTION/ DROPS OPHTHALMIC NIGHTLY
Status: DISCONTINUED | OUTPATIENT
Start: 2022-03-14 | End: 2022-03-15 | Stop reason: HOSPADM

## 2022-03-14 RX ORDER — ROSUVASTATIN CALCIUM 40 MG/1
40 TABLET, COATED ORAL NIGHTLY
COMMUNITY
Start: 2022-03-09

## 2022-03-14 RX ORDER — ROSUVASTATIN CALCIUM 40 MG/1
40 TABLET, COATED ORAL NIGHTLY
Status: DISCONTINUED | OUTPATIENT
Start: 2022-03-14 | End: 2022-03-15 | Stop reason: HOSPADM

## 2022-03-14 RX ORDER — AMOXICILLIN AND CLAVULANATE POTASSIUM 875; 125 MG/1; MG/1
1 TABLET, FILM COATED ORAL
COMMUNITY
Start: 2022-03-09 | End: 2022-03-15 | Stop reason: HOSPADM

## 2022-03-14 RX ORDER — LEVOFLOXACIN 5 MG/ML
750 INJECTION, SOLUTION INTRAVENOUS ONCE
Status: COMPLETED | OUTPATIENT
Start: 2022-03-14 | End: 2022-03-14

## 2022-03-14 RX ORDER — LEVOFLOXACIN 5 MG/ML
500 INJECTION, SOLUTION INTRAVENOUS
Status: DISCONTINUED | OUTPATIENT
Start: 2022-03-15 | End: 2022-03-15

## 2022-03-14 RX ORDER — COLESTIPOL HYDROCHLORIDE 1 G/1
1 TABLET ORAL DAILY
Status: DISCONTINUED | OUTPATIENT
Start: 2022-03-15 | End: 2022-03-15 | Stop reason: HOSPADM

## 2022-03-14 RX ORDER — DEXTROSE 40 %
15-30 GEL (GRAM) ORAL AS NEEDED
Status: DISCONTINUED | OUTPATIENT
Start: 2022-03-14 | End: 2022-03-15 | Stop reason: HOSPADM

## 2022-03-14 RX ORDER — HEPARIN SODIUM 5000 [USP'U]/ML
5000 INJECTION, SOLUTION INTRAVENOUS; SUBCUTANEOUS EVERY 8 HOURS
Status: DISCONTINUED | OUTPATIENT
Start: 2022-03-14 | End: 2022-03-15 | Stop reason: HOSPADM

## 2022-03-14 RX ORDER — AMLODIPINE BESYLATE 5 MG/1
5 TABLET ORAL DAILY
COMMUNITY
Start: 2021-12-12

## 2022-03-14 RX ADMIN — LEVOFLOXACIN 750 MG: 750 INJECTION, SOLUTION INTRAVENOUS at 14:51

## 2022-03-14 RX ADMIN — HEPARIN SODIUM 5000 UNITS: 5000 INJECTION, SOLUTION INTRAVENOUS; SUBCUTANEOUS at 23:27

## 2022-03-14 RX ADMIN — ROSUVASTATIN CALCIUM 40 MG: 40 TABLET, FILM COATED ORAL at 23:27

## 2022-03-14 RX ADMIN — IOHEXOL 80 ML: 350 INJECTION, SOLUTION INTRAVENOUS at 14:06

## 2022-03-14 ASSESSMENT — ENCOUNTER SYMPTOMS
SHORTNESS OF BREATH: 1
HEADACHES: 0
NAUSEA: 0
FATIGUE: 1
FLANK PAIN: 0
LIGHT-HEADEDNESS: 0
DIZZINESS: 0
WHEEZING: 0
PALPITATIONS: 0
BACK PAIN: 0
ABDOMINAL PAIN: 0
CHEST TIGHTNESS: 0
CONSTIPATION: 0
CHILLS: 1
DIARRHEA: 0
NUMBNESS: 0
CONFUSION: 0
WEAKNESS: 0
VOMITING: 0
COUGH: 1
FEVER: 1

## 2022-03-14 ASSESSMENT — PATIENT HEALTH QUESTIONNAIRE - PHQ9: SUM OF ALL RESPONSES TO PHQ9 QUESTIONS 1 & 2: 0
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
## 2022-03-14 NOTE — ED ATTESTATION NOTE
I have personally seen and examined the patient.  I reviewed and agree with physician assistant / nurse practitioner’s assessment and plan of care, with the following exceptions: None  My examination, assessment, and plan of care of Julian Vazquez is as follows: 65yo M w/recent diagnosis of pneumonia 10 days ago, rx augmentin by PMD and doxycycline by UC, symptoms have been worsening over past several days despite PO abx. Pt is alert and in NAD. Heart exam reveals RRR. Lungs with mild rales in lower R field, no respiratory distress.Plan to admit for IV antibiotics.        I was physically present for the key/critical portions of the following procedures: None         Ena Santana DO  03/14/22 1920

## 2022-03-14 NOTE — ASSESSMENT & PLAN NOTE
12 days of symptoms, despite outpatient antibiotics, was prescribed augmentin and doxy  CT chest noted; multofocal PNA  - COVID, RSV, Flu A/B PCR negative. Full respiratory viral panel also negative.  - Legionella urine antigen -> Normal.   Sputum culture ordered.  On Levaquin per ID; QTc ok per ID  Pulmonology consult appreciated  Bronchodilators, mucolytics  Patient feels better and is ambulating and is on room air. No fever.  Leukocytosis is trending down.   He wants to be discharged home this evening and does not wish to stay for any further monitoring.  He will follow up with pulmonology and his PCP.  Will need repeat imaging of his chest as outpatient.
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
## 2022-03-15 VITALS
WEIGHT: 195 LBS | HEIGHT: 70 IN | HEART RATE: 62 BPM | TEMPERATURE: 98.8 F | SYSTOLIC BLOOD PRESSURE: 123 MMHG | DIASTOLIC BLOOD PRESSURE: 68 MMHG | RESPIRATION RATE: 16 BRPM | BODY MASS INDEX: 27.92 KG/M2 | OXYGEN SATURATION: 96 %

## 2022-03-15 LAB
ANION GAP SERPL CALC-SCNC: 12 MEQ/L (ref 3–15)
ATRIAL RATE: 68
ATRIAL RATE: 72
BUN SERPL-MCNC: 9 MG/DL (ref 8–20)
CALCIUM SERPL-MCNC: 8.7 MG/DL (ref 8.9–10.3)
CHLORIDE SERPL-SCNC: 102 MEQ/L (ref 98–109)
CO2 SERPL-SCNC: 25 MEQ/L (ref 22–32)
CREAT SERPL-MCNC: 0.9 MG/DL (ref 0.8–1.3)
ERYTHROCYTE [DISTWIDTH] IN BLOOD BY AUTOMATED COUNT: 12.9 % (ref 11.6–14.4)
FLUAV RNA SPEC QL NAA+PROBE: NEGATIVE
FLUBV RNA SPEC QL NAA+PROBE: NEGATIVE
GFR SERPL CREATININE-BSD FRML MDRD: >60 ML/MIN/1.73M*2
GLUCOSE SERPL-MCNC: 116 MG/DL (ref 70–99)
HADV DNA SPEC QL NAA+PROBE: NEGATIVE
HCOV 229E RNA SPEC QL NAA+PROBE: NEGATIVE
HCOV HKU1 RNA SPEC QL NAA+PROBE: NEGATIVE
HCOV NL63 RNA SPEC QL NAA+PROBE: NEGATIVE
HCOV OC43 RNA SPEC QL NAA+PROBE: NEGATIVE
HCT VFR BLDCO AUTO: 39.9 % (ref 40.1–51)
HGB BLD-MCNC: 12.7 G/DL (ref 13.7–17.5)
HMPV RNA SPEC QL NAA+PROBE: NEGATIVE
HPIV1 RNA SPEC QL NAA+PROBE: NEGATIVE
HPIV2 RNA SPEC QL NAA+PROBE: NEGATIVE
HPIV3 RNA SPEC QL NAA+PROBE: NEGATIVE
HPIV4 RNA SPEC QL NAA+PROBE: NEGATIVE
L PNEUMO1 AG UR QL: NEGATIVE
MCH RBC QN AUTO: 31.4 PG (ref 28–33.2)
MCHC RBC AUTO-ENTMCNC: 31.8 G/DL (ref 32.2–36.5)
MCV RBC AUTO: 98.5 FL (ref 83–98)
P AXIS: 28
P AXIS: 43
PDW BLD AUTO: 9.9 FL (ref 9.4–12.4)
PLATELET # BLD AUTO: 401 K/UL (ref 150–350)
POTASSIUM SERPL-SCNC: 4.3 MEQ/L (ref 3.6–5.1)
PR INTERVAL: 184
PR INTERVAL: 194
QRS DURATION: 108
QRS DURATION: 114
QT INTERVAL: 424
QT INTERVAL: 448
QTC CALCULATION(BAZETT): 464
QTC CALCULATION(BAZETT): 476
R AXIS: -63
R AXIS: -64
RBC # BLD AUTO: 4.05 M/UL (ref 4.5–5.8)
RSV RNA SPEC QL NAA+PROBE: NEGATIVE
RV+EV RNA SPEC QL NAA+PROBE: NEGATIVE
SODIUM SERPL-SCNC: 139 MEQ/L (ref 136–144)
T WAVE AXIS: 64
T WAVE AXIS: 66
TEST PERFORMANCE INFO SPEC: NORMAL
VENTRICULAR RATE: 68
VENTRICULAR RATE: 72
WBC # BLD AUTO: 12.45 K/UL (ref 3.8–10.5)

## 2022-03-15 PROCEDURE — 36415 COLL VENOUS BLD VENIPUNCTURE: CPT | Performed by: HOSPITALIST

## 2022-03-15 PROCEDURE — 63700000 HC SELF-ADMINISTRABLE DRUG: Performed by: HOSPITALIST

## 2022-03-15 PROCEDURE — 85027 COMPLETE CBC AUTOMATED: CPT | Performed by: HOSPITALIST

## 2022-03-15 PROCEDURE — 63700000 HC SELF-ADMINISTRABLE DRUG: Performed by: NURSE PRACTITIONER

## 2022-03-15 PROCEDURE — 93010 ELECTROCARDIOGRAM REPORT: CPT | Performed by: INTERNAL MEDICINE

## 2022-03-15 PROCEDURE — 93005 ELECTROCARDIOGRAM TRACING: CPT | Performed by: NURSE PRACTITIONER

## 2022-03-15 PROCEDURE — 99239 HOSP IP/OBS DSCHRG MGMT >30: CPT | Performed by: HOSPITALIST

## 2022-03-15 PROCEDURE — 93010 ELECTROCARDIOGRAM REPORT: CPT | Mod: 76 | Performed by: INTERNAL MEDICINE

## 2022-03-15 PROCEDURE — 80048 BASIC METABOLIC PNL TOTAL CA: CPT | Performed by: HOSPITALIST

## 2022-03-15 PROCEDURE — 99999 PR OFFICE/OUTPT VISIT,PROCEDURE ONLY: CPT | Performed by: HOSPITALIST

## 2022-03-15 PROCEDURE — 63600000 HC DRUGS/DETAIL CODE: Performed by: HOSPITALIST

## 2022-03-15 PROCEDURE — 87449 NOS EACH ORGANISM AG IA: CPT | Performed by: INTERNAL MEDICINE

## 2022-03-15 RX ORDER — ALBUTEROL SULFATE 90 UG/1
2 INHALANT RESPIRATORY (INHALATION) EVERY 6 HOURS PRN
Qty: 18 G | Refills: 1 | Status: SHIPPED | OUTPATIENT
Start: 2022-03-15 | End: 2022-04-14

## 2022-03-15 RX ORDER — GUAIFENESIN 600 MG/1
600 TABLET, EXTENDED RELEASE ORAL 2 TIMES DAILY
Status: DISCONTINUED | OUTPATIENT
Start: 2022-03-15 | End: 2022-03-15 | Stop reason: HOSPADM

## 2022-03-15 RX ORDER — LEVOFLOXACIN 500 MG/1
500 TABLET, FILM COATED ORAL DAILY
Qty: 5 TABLET | Refills: 0 | Status: SHIPPED | OUTPATIENT
Start: 2022-03-16 | End: 2022-03-21

## 2022-03-15 RX ORDER — GUAIFENESIN 600 MG/1
600 TABLET, EXTENDED RELEASE ORAL 2 TIMES DAILY
Qty: 20 TABLET | Refills: 0 | Status: SHIPPED | OUTPATIENT
Start: 2022-03-15 | End: 2022-03-25

## 2022-03-15 RX ORDER — ALBUTEROL SULFATE 90 UG/1
2 INHALANT RESPIRATORY (INHALATION) EVERY 6 HOURS PRN
Status: DISCONTINUED | OUTPATIENT
Start: 2022-03-15 | End: 2022-03-15 | Stop reason: HOSPADM

## 2022-03-15 RX ORDER — LEVOFLOXACIN 500 MG/1
500 TABLET, FILM COATED ORAL DAILY
Status: DISCONTINUED | OUTPATIENT
Start: 2022-03-15 | End: 2022-03-15 | Stop reason: HOSPADM

## 2022-03-15 RX ADMIN — HEPARIN SODIUM 5000 UNITS: 5000 INJECTION, SOLUTION INTRAVENOUS; SUBCUTANEOUS at 15:25

## 2022-03-15 RX ADMIN — LEVOFLOXACIN 500 MG: 500 TABLET, FILM COATED ORAL at 15:25

## 2022-03-15 RX ADMIN — HEPARIN SODIUM 5000 UNITS: 5000 INJECTION, SOLUTION INTRAVENOUS; SUBCUTANEOUS at 06:04

## 2022-03-15 RX ADMIN — COLESTIPOL HYDROCHLORIDE 1 G: 1 TABLET ORAL at 08:29

## 2022-03-15 RX ADMIN — AMLODIPINE BESYLATE 5 MG: 5 TABLET ORAL at 08:29

## 2022-03-15 RX ADMIN — LATANOPROST 1 DROP: 50 SOLUTION/ DROPS OPHTHALMIC at 00:03

## 2022-03-15 RX ADMIN — GUAIFENESIN 600 MG: 600 TABLET, EXTENDED RELEASE ORAL at 15:25

## 2022-03-15 NOTE — PLAN OF CARE
Plan of Care Review  Plan of Care Reviewed With: patient  Progress: improving  Outcome Summary: Pt doing okay this morning. Lung sounds are diminished/coarse, with frequent congested coughs from the pt. Encourged IS use, SpO2 hanging around 90-92% on RA. Will continue to monitor.

## 2022-03-15 NOTE — PATIENT CARE CONFERENCE
Care Progression Rounds Note  Date: 3/15/2022  Time: 11:09 AM     Patient Name: Julian Vazquez     Medical Record Number: [id number]   YOB: 1955  Sex: Male      Room/Bed: 4417W    Admitting Diagnosis: Multifocal pneumonia [J18.9]   Admit Date/Time: 3/14/2022 11:23 AM    Primary Diagnosis: Multifocal pneumonia  Principal Problem: Multifocal pneumonia    GMLOS: pending  Anticipated Discharge Date: 3/16/2022    AM-PAC:  Mobility Score:      Discharge Planning:  Anticipated Discharge Disposition: home without assistance or services    Barriers to Discharge:  Medical issues not resolved    Comments:  PNA, BL Plueral effusions, PO abx, RA, sputum culture pending    Participants:  ,physical therapy,nursing,occupational therapy,social work/services

## 2022-03-15 NOTE — DISCHARGE INSTRUCTIONS
-Follow up with your primary care physician within one week.  -Check repeat labwork; CBC, CMP, Magnesium level, when you see your primary care physician this week.  -Follow up with Dr. Oconnor, pulmonology, in 1-2 weeks. You will need to discuss getting repeat imaging of your chest with pulmonology.  -If you have chest pain, shortness of breath, fever, uncontrolled vomiting or diarrhea, severe abdominal pain, light headedness, passing out, vision change, numbness/tingling/weakness, or any additional concerning symptoms as discussed, return to the Emergency Department.

## 2022-03-15 NOTE — NURSING NOTE
Reviewed discharge instructions with patient. No further questions asked at this time about instructions, patient being discharged via wheelchair downstairs to his own car.

## 2022-03-15 NOTE — PROGRESS NOTES
Hospital Medicine Service -  Daily Progress Note       SUBJECTIVE   Patient seen and examined earlier this morning.   Resting in bed  Feeling about the same  No distress  Cough productive of dark green sputum  No CP  afebrile     OBJECTIVE      Vital signs in last 24 hours:  Temp:  [36.9 °C (98.5 °F)-37.6 °C (99.6 °F)] 36.9 °C (98.5 °F)  Heart Rate:  [61-88] 67  Resp:  [16-23] 16  BP: (130-149)/(69-78) 133/71    PHYSICAL EXAMINATION        General:  no acute distress  HEENT:  PERRL, anicteric sclera   Neck: supple, no JVD  Cardiac: RRR, +S1/S2   Lungs: coarse bilaterally  Abdomen: soft, NT/ND, +BS, no rebound/guarding   Extremities: no edema, distal perfusion intact  Neuro: AAOx3, nonfocal.   Skin: no rash  Psych: cooperative     LABS / IMAGING / TELE      Labs (personally reviewed):  Results from last 7 days   Lab Units 03/15/22  0438   SODIUM mEQ/L 139   POTASSIUM mEQ/L 4.3   CHLORIDE mEQ/L 102   CO2 mEQ/L 25   BUN mg/dL 9   CREATININE mg/dL 0.9   GLUCOSE mg/dL 116*   CALCIUM mg/dL 8.7*          Results from last 7 days   Lab Units 03/15/22  0438   WBC K/uL 12.45*   HEMOGLOBIN g/dL 12.7*   HEMATOCRIT % 39.9*   PLATELETS K/uL 401*                Microbiology Data (personally reviewed):  Microbiology Results     Procedure Component Value Units Date/Time    SARS-CoV-2 (COVID-19), PCR Nasopharynx [[id number]]  (Normal) Collected: 03/14/22 1113    Specimen: Nasopharyngeal Swab from Nasopharynx Updated: 03/14/22 1153    Narrative:      The following orders were created for panel order SARS-CoV-2 (COVID-19), PCR Nasopharynx.  Procedure                               Abnormality         Status                     ---------                               -----------         ------                     SARS-COV-2 (COVID-19)/ F...[[id number]]  Normal              Final result                 Please view results for these tests on the individual orders.    SARS-COV-2 (COVID-19)/ FLU A/B, AND RSV, PCR Nasopharynx [[id number]]   (Normal) Collected: 03/14/22 1113    Specimen: Nasopharyngeal Swab from Nasopharynx Updated: 03/14/22 1153     SARS-CoV-2 (COVID-19) Negative     Influenza A Negative     Influenza B Negative     Respiratory Syncytial Virus Negative    Respiratory virus panel, PCR (Only to be ordered by ID or Critical Care physicians) Nasopharynx [[id number]] Collected: 03/14/22 1113    Specimen: Nasopharyngeal Swab from Nasopharynx Updated: 03/15/22 0040     Adenovirus Negative     Coronavirus 229E Negative     Coronavirus HKU1 Negative     Coronavirus NL63 Negative     Coronavirus OC43 Negative     Human Metapneumovirus Negative     Rhinovirus/Enterovirus Negative     Influenza A Negative     Influenza B Negative     Parainfluenza Virus 1 Negative     Parainfluenza Virus 2 Negative     Parainfluenza Virus 3 Negative     Parainfluenza Virus 4 Negative     Respiratory Syncytial Virus Negative     Viral Respiratory Comment --          Imaging  I have independently reviewed the pertinent imaging from the last 24 hrs.    ECG/Telemetry  Telemetry reviewed    ASSESSMENT AND PLAN      * Multifocal pneumonia  Assessment & Plan  12 days of symptoms, failed outpatient antibiotics, was prescribed augmentin and doxy  CT chest noted; multofocal PNA  Viral pcr negative  Sputum culture  On Levaquin per ID; monitor QTc  Consult pulmonology  Bronchodilators, mucolytics        Dyslipidemia  Assessment & Plan  Continue statin    HTN (hypertension)  Assessment & Plan  Continue amlodipine         VTE Assessment: Pad[place] VTE Score: 4  VTE Prophylaxis Plan: Current anticoagulants:  heparin (porcine) 5,000 unit/mL injection 5,000 Units, subcutaneous, q8h HARRISON      Code Status: Full Code    Estimated discharge date: 3/16/2022          Tommy Carreon DO  3/15/2022  11:57 AM
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
## 2022-03-15 NOTE — PLAN OF CARE
Problem: Adult Inpatient Plan of Care  Goal: Plan of Care Review  3/15/2022 0203 by Hope Leigh, RN  Outcome: Progressing  Flowsheets (Taken 3/15/2022 0203)  Progress: improving  Plan of Care Reviewed With: patient  Outcome Summary: Rec'd pt from ED, pt was able to walk from stretcher to bed, gait steady. No c/o pain, palps, SOB, n/v/d. VSS. Meds administered. Call bell/belongings within reach, bed alarm off.  3/15/2022 0202 by Hope Leigh, RN  Outcome: Progressing

## 2022-03-15 NOTE — DISCHARGE SUMMARY
[facility] DISCHARGE SUMMARY       BRIEF OVERVIEW   Admitting Provider: Clinton Vallejo MD  Attending Provider: Tommy Carreon DO Attending phys phone: [phone]    PCP: Bryanna Khan [phone]    Admission Date: 3/14/2022  Discharge Date: 3/15/2022    Discharge To: home     DISCHARGE DIAGNOSES      Primary Discharge Diagnosis  Multifocal pneumonia    Secondary Discharge Diagnoses  Active Hospital Problems    Diagnosis Date Noted   • Multifocal pneumonia 03/14/2022     Priority: High   • HTN (hypertension) 03/14/2022   • Dyslipidemia 03/14/2022      Resolved Hospital Problems   No resolved problems to display.     SUMMARY OF HOSPITALIZATION      Presenting Problem/History of Present Illness  Multifocal pneumonia [J18.9]    This is a 66 y.o. year-old male admitted on 3/14/2022 with Multifocal pneumonia [J18.9].    See admit H and P and ED documentation for further details.     Hospital Course    See below Assessment and Plan for further details to the hospitalization. See  consult notes for additional details.     Assessment and Plan:  * Multifocal pneumonia  Assessment & Plan  12 days of symptoms, despite outpatient antibiotics, was prescribed augmentin and doxy  CT chest noted; multofocal PNA  - COVID, RSV, Flu A/B PCR negative. Full respiratory viral panel also negative.  - Legionella urine antigen -> Normal.   Sputum culture ordered.  On Levaquin per ID; QTc ok per ID  Pulmonology consult appreciated  Bronchodilators, mucolytics  Patient feels better and is ambulating and is on room air. No fever.  Leukocytosis is trending down.   He wants to be discharged home this evening and does not wish to stay for any further monitoring.  He will follow up with pulmonology and his PCP.  Will need repeat imaging of his chest as outpatient.       Dyslipidemia  Assessment & Plan  Continue statin    HTN (hypertension)  Assessment & Plan  Continue amlodipine          Consults During Admission  IP  CONSULT TO INFECTIOUS DISEASE  IP CONSULT TO PULMONOLOGY/SLEEP MEDICINE      DISCHARGE MEDICATIONS       Medication List      START taking these medications    albuterol HFA 90 mcg/actuation inhaler  Commonly known as: VENTOLIN HFA  Inhale 2 puffs every 6 (six) hours as needed for wheezing or shortness of breath.  Dose: 2 puff     guaiFENesin 600 mg 12 hr tablet  Commonly known as: MUCINEX  Take 1 tablet (600 mg total) by mouth 2 (two) times a day for 10 days.  Dose: 600 mg     levoFLOXacin 500 mg tablet  Commonly known as: LEVAQUIN  Start taking on: March 16, 2022  Take 1 tablet (500 mg total) by mouth daily for 5 doses Indications: pneumonia.  Dose: 500 mg        CONTINUE taking these medications    amLODIPine 5 mg tablet  Commonly known as: NORVASC  Take 5 mg by mouth daily.  Dose: 5 mg     colestipoL 1 gram tablet  Commonly known as: COLESTID  Take 1 g by mouth daily.  Dose: 1 g     latanoprost 0.005 % ophthalmic solution  Commonly known as: XALATAN  Administer 1 drop into the right eye nightly.  Dose: 1 drop     rosuvastatin 40 mg tablet  Commonly known as: CRESTOR  Take 40 mg by mouth nightly.  Dose: 40 mg        STOP taking these medications    amoxicillin-pot clavulanate 875-125 mg per tablet  Commonly known as: AUGMENTIN     doxycycline hyclate 100 mg capsule  Commonly known as: VIBRAMYCIN              PROCEDURES / LABS / IMAGING          Pertinent Imaging  X-RAY CHEST 2 VIEWS    Result Date: 3/14/2022  IMPRESSION:  Interval development of right basilar infiltrate and trace effusion. Persistent right upper and midlung infiltrate, unchanged COMMENT:  PA and lateral views the chest are compared with 3/12/2021. There is been interval development of a patchy right basilar infiltrate and probable trace effusion. Faint hazy infiltrate in the right upper and mid lung persists, similar to prior. Left lung is clear. The heart is mildly enlarged. The pulmonary vasculature is normal.     X-RAY CHEST 2 VIEWS    Result  Date: 3/12/2022  IMPRESSION: Hazy opacities in the right lung field more prominent in the right upper lobe, possibly representing pneumonia versus more confluent edema.    CT CHEST WITH IV CONTRAST    Result Date: 3/14/2022  IMPRESSION: Multifocal pneumonia most prominent in the right upper lobe. Trace bilateral pleural effusions.      OUTPATIENT  FOLLOW-UP / REFERRALS / PENDING TESTS          Important Issues to Address in Follow-Up  Follow up/Discharge instructions discussed with the patient and/or power of  at the time of discharge include but are not limited to:      -Follow up with your primary care physician within one week.  -Check repeat labwork; CBC, CMP, Magnesium level, when you see your primary care physician this week.  -Follow up with Dr. Oconnor, pulmonology, in 1-2 weeks. You will need to discuss getting repeat imaging of your chest with pulmonology.  -If you have chest pain, shortness of breath, fever, uncontrolled vomiting or diarrhea, severe abdominal pain, light headedness, passing out, vision change, numbness/tingling/weakness, or any additional concerning symptoms as discussed, return to the Emergency Department.       DISCHARGE DISPOSITION      40 minutes spent on patient care and coordination of today's discharge. This includes discussing the discharge plan, discharge medications, and follow up instructions with the patient and/or power of , as well as coordinating care with nursing and consulting physicians.     Discharge to: home    Code Status At Discharge: Full Code    Physician Order for Life-Sustaining Treatment Document Status      No documents found

## 2022-03-15 NOTE — PLAN OF CARE
Problem: Adult Inpatient Plan of Care  Goal: Plan of Care Review  Flowsheets (Taken 3/15/2022 1254)  Plan of Care Reviewed With: patient  Goal: Readiness for Transition of Care  Intervention: Mutually Develop Transition Plan  Flowsheets (Taken 3/15/2022 1254)  Anticipated Discharge Disposition: home without assistance or services  Discharge Coordination/Progress:     Case Management Note:  met patient in the room, observed COVID-19 protocols  PCP: Erin  Pharmacy: [facility]  Vaccination status: Yes Modern and boostered  Housing: completed  PLOF: independent, ambulatory  DME: none  Social Determinates of Health: Pt has food, clothing, housing and basic needs.   HOME CARE SERVICES/SNF: none  Anticipated disposition:  home with spouse  Transportation:  spouse  Explained the role of the CC team   Assistive Device/Animal Currently Used at Home: none  Anticipated Changes Related to Illness: none  Transportation Concerns: car, none  Readmission Within the Last 30 Days: no previous admission in last 30 days  Patient/Family Anticipated Services at Transition: none  Patient/Family Anticipates Transition to: home with family  Transportation Anticipated: family or friend will provide  Concerns to be Addressed:   denies needs/concerns at this time   no discharge needs identified  Offered/Gave Vendor List: no  Patient is now on PO abx,   Anticipates discharged to home later today.

## 2022-03-16 NOTE — UM PHYSICIAN REVIEW NOTE
Utilization Secondary Review Note      Patient Name: Julian Vazquez      MRN: [id number]  Insurance: MEDICARE  Admission date: 3/14/2022  Initial order:    Inpatient  Planned admission: No  Post Discharge Review: Yes            Outpatient services are appropriate for this 66 y.o. year old patient who received < 2 [place] HLOC for treatment of multifocal pneumonia.    Second review needed.     Tracy Díaz,   3/16/2022

## 2022-03-16 NOTE — UM PHYSICIAN REVIEW NOTE
Utilization Secondary Review Note      Patient Name: Julian Vazquez      MRN: [id number]  Insurance: MEDICARE  Admission date: 3/14/2022  Initial order:    Inpatient  Planned admission: No  Post Discharge Review: Yes            Outpatient services are appropriate for this 66 y.o. male with 1 midnight hospital stay for multifocal pneumonia.     Mera Jara, DO  3/16/2022

## 2022-03-19 LAB
BACTERIA BLD CULT: NORMAL
BACTERIA BLD CULT: NORMAL

## 2022-04-15 ENCOUNTER — TRANSCRIBE ORDERS (OUTPATIENT)
Dept: SCHEDULING | Age: 67
End: 2022-04-15

## 2022-04-18 ENCOUNTER — TRANSCRIBE ORDERS (OUTPATIENT)
Dept: SCHEDULING | Age: 67
End: 2022-04-18

## 2022-04-18 DIAGNOSIS — J18.9 PNEUMONIA, UNSPECIFIED ORGANISM: Primary | ICD-10-CM

## 2022-04-20 ENCOUNTER — HOSPITAL ENCOUNTER (OUTPATIENT)
Dept: RADIOLOGY | Facility: HOSPITAL | Age: 67
Discharge: HOME | End: 2022-04-20
Attending: PHYSICIAN ASSISTANT
Payer: MEDICARE

## 2022-04-20 DIAGNOSIS — J18.9 PNEUMONIA, UNSPECIFIED ORGANISM: ICD-10-CM

## 2022-04-20 PROCEDURE — 71250 CT THORAX DX C-: CPT

## 2022-09-26 NOTE — PROGRESS NOTES
"Infectious Disease Progress Note    Patient Name: Julian Vazquez  MR#: [id number]  : 1955  Admission Date: 3/14/2022  Date: 03/15/22   Time: 11:39 AM   Author: XIAO Hernandez    Major Events:     Antibiotics:    Anti-infectives (From admission, onward)    Start     Dose/Rate Route Frequency Ordered Stop    03/15/22 1500  levoFLOXacin (LEVAQUIN) IVPB 500 mg         500 mg  100 mL/hr over 60 Minutes intravenous Every 24 hours interval 22 1534 22 1459          Subjective   Patient states he is still having some coughing and the sputum has changed from green to yellow, no shortness of breath, no chest pain, tolerating diet, no abdominal discomfort or diarrhea, asking if he can go home soon, d/w nursing     Review of Systems    Pertinent items are noted in HPI.    Objective     Vital Signs:    Patient Vitals for the past 72 hrs:   BP Temp Temp src Pulse Resp SpO2 Height Weight   03/15/22 0730 133/71 36.9 °C (98.5 °F) Oral 67 16 (!) 90 % -- --   03/15/22 0438 (!) 149/78 37.2 °C (98.9 °F) Oral 70 16 94 % -- --   03/15/22 0300 -- -- -- 68 -- -- -- --   22 2249 -- -- -- -- -- -- -- 88.5 kg (195 lb)   22 2240 (!) 146/76 37.6 °C (99.6 °F) Oral 70 18 93 % 1.778 m (5' 10\") --   22 2058 (!) 146/78 -- -- 80 20 94 % -- --   22 1742 (!) 149/76 -- -- 67 20 95 % -- --   22 1559 -- -- -- 61 (!) 23 95 % -- --   22 1444 138/78 -- -- 65 (!) 22 (!) 90 % -- --   22 1416 130/69 -- -- 62 (!) 23 92 % -- --   22 1314 (!) 141/72 -- -- 65 (!) 21 92 % -- --   22 1208 (!) 148/72 -- -- 88 16 -- -- --   22 1107 (!) 155/68 37.3 °C (99.1 °F) Oral 74 (!) 22 95 % 1.778 m (5' 10\") 83.9 kg (185 lb)       Temp (72hrs), Av.2 °C (99 °F), Min:36.9 °C (98.5 °F), Max:37.6 °C (99.6 °F)      Physical Exam:    Visit Vitals  /71 (BP Location: Right upper arm, Patient Position: Lying)   Pulse 67   Temp 36.9 °C (98.5 °F) (Oral)   Resp 16   Ht 1.778 m (5' 10\") "   Wt 88.5 kg (195 lb)   SpO2 (!) 90%   BMI 27.98 kg/m²     General appearance: alert, appears stated age, cooperative and no distress  Eyes: anicteric  Lungs: crackles right lung base, left lung base and diminished bilaterally, infrequent cough, nonlabored  Abdomen: soft, ND, NT  Neurologic: Mental status: Alert, oriented, thought content appropriate    Lines, Drains, Airways, Wounds:  Peripheral IV (Adult) 03/14/22 Left Antecubital (Active)   Number of days: 1       Labs:    CBC Results       03/15/22 03/14/22 11/04/20     0438 1119 0832    WBC 12.45 14.22 5.02    RBC 4.05 4.15 4.42    HGB 12.7 13.1 14.5    HCT 39.9 40.7 42.7    MCV 98.5 98.1 96.6    MCH 31.4 31.6 32.8    MCHC 31.8 32.2 34.0     381 242      BMP Results       03/15/22 03/14/22 11/04/20     0438 1233 0832     136 139    K 4.3 4.2 4.5    Cl 102 100 104    CO2 25 24 24    Glucose 116 114 105    BUN 9 10 12    Creatinine 0.9 1.0 0.9    Calcium 8.7 8.9 9.2    Anion Gap 12 12 11    EGFR >60.0 >60.0 >60.0         Comment for K at 1233 on 03/14/22: Results obtained on plasma. Plasma Potassium values may be up to 0.4 mEQ/L less than serum values. The differences may be greater for patients with high platelet or white cell counts.      PT/PTT Results    No lab values to display.     UA Results       07/05/18     1421    Color Yellow    Clarity Clear    Glucose Negative    Bilirubin Negative    Ketones Negative    Sp Grav 1.008    Blood +2    Ph 7.0    Protein Negative    Urobilinogen 0.2    Nitrite Negative    Leuk Est Negative    WBC 0 TO 3    RBC 5 TO 9    Bacteria None Seen         Comment for Blood at 1421 on 07/05/18:   The sensitivity of the occult blood test is equivalent to approximately 4 intact RBC/HPF.    Comment for Leuk Est at 1421 on 07/05/18:   Results can be falsely negative due to high specific gravity, some antibiotics, glucose >3 g/dl, or WBC other than neutrophils.      Lactate Results    No lab values to display.          Microbiology Results     Procedure Component Value Units Date/Time    SARS-CoV-2 (COVID-19), PCR Nasopharynx [[id number]]  (Normal) Collected: 03/14/22 1113    Specimen: Nasopharyngeal Swab from Nasopharynx Updated: 03/14/22 1153    Narrative:      The following orders were created for panel order SARS-CoV-2 (COVID-19), PCR Nasopharynx.  Procedure                               Abnormality         Status                     ---------                               -----------         ------                     SARS-COV-2 (COVID-19)/ F...[[id number]]  Normal              Final result                 Please view results for these tests on the individual orders.    SARS-COV-2 (COVID-19)/ FLU A/B, AND RSV, PCR Nasopharynx [[id number]]  (Normal) Collected: 03/14/22 1113    Specimen: Nasopharyngeal Swab from Nasopharynx Updated: 03/14/22 1153     SARS-CoV-2 (COVID-19) Negative     Influenza A Negative     Influenza B Negative     Respiratory Syncytial Virus Negative    Respiratory virus panel, PCR (Only to be ordered by ID or Critical Care physicians) Nasopharynx [[id number]] Collected: 03/14/22 1113    Specimen: Nasopharyngeal Swab from Nasopharynx Updated: 03/15/22 0040     Adenovirus Negative     Coronavirus 229E Negative     Coronavirus HKU1 Negative     Coronavirus NL63 Negative     Coronavirus OC43 Negative     Human Metapneumovirus Negative     Rhinovirus/Enterovirus Negative     Influenza A Negative     Influenza B Negative     Parainfluenza Virus 1 Negative     Parainfluenza Virus 2 Negative     Parainfluenza Virus 3 Negative     Parainfluenza Virus 4 Negative     Respiratory Syncytial Virus Negative     Viral Respiratory Comment --          Pathology Results     ** No results found for the last 720 hours. **          Echo:         Imaging:    Radiology Imaging    XR CHEST 2 VW    Narrative  CLINICAL HISTORY:  Cough and shortness of breath    --    Impression  Interval development of right basilar infiltrate and  trace  effusion. Persistent right upper and midlung infiltrate, unchanged    COMMENT:  PA and lateral views the chest are compared with 3/12/2021. There is  been interval development of a patchy right basilar infiltrate and probable  trace effusion. Faint hazy infiltrate in the right upper and mid lung persists,  similar to prior. Left lung is clear. The heart is mildly enlarged. The  pulmonary vasculature is normal.      Assessment     CAP  - afebrile  - no hypoxia  - minimal leukocytosis, improving  - legionella ag negative  - admits to sputum production  - respiratory viral PCR negative           Plan     Adjusted to oral Levofloxacin ( repeat EKG today with borderline acceptable QTC 476ms), sputum c/s ordered   Yes

## 2022-12-01 ENCOUNTER — HOSPITAL ENCOUNTER (OUTPATIENT)
Facility: HOSPITAL | Age: 67
Setting detail: HOSPITAL OUTPATIENT SURGERY
End: 2022-12-01
Attending: OPHTHALMOLOGY | Admitting: OPHTHALMOLOGY
Payer: MEDICARE

## 2022-12-01 ENCOUNTER — HOSPITAL ENCOUNTER (OUTPATIENT)
Dept: RADIOLOGY | Facility: HOSPITAL | Age: 67
Discharge: HOME | End: 2022-12-01
Attending: FAMILY MEDICINE
Payer: MEDICARE

## 2022-12-01 ENCOUNTER — TRANSCRIBE ORDERS (OUTPATIENT)
Dept: SCHEDULING | Age: 67
End: 2022-12-01

## 2022-12-01 DIAGNOSIS — R22.42 LOCALIZED SWELLING, MASS AND LUMP, LEFT LOWER LIMB: Primary | ICD-10-CM

## 2022-12-01 DIAGNOSIS — R22.42 LOCALIZED SWELLING, MASS AND LUMP, LEFT LOWER LIMB: ICD-10-CM

## 2022-12-01 PROCEDURE — 73552 X-RAY EXAM OF FEMUR 2/>: CPT | Mod: LT

## 2022-12-02 ENCOUNTER — TRANSCRIBE ORDERS (OUTPATIENT)
Dept: SCHEDULING | Age: 67
End: 2022-12-02

## 2022-12-02 DIAGNOSIS — R22.42 LOCALIZED SWELLING, MASS AND LUMP, LEFT LOWER LIMB: Primary | ICD-10-CM

## 2022-12-05 ENCOUNTER — HOSPITAL ENCOUNTER (OUTPATIENT)
Dept: RADIOLOGY | Facility: HOSPITAL | Age: 67
Discharge: HOME | End: 2022-12-05
Attending: FAMILY MEDICINE
Payer: MEDICARE

## 2022-12-05 DIAGNOSIS — R22.42 LOCALIZED SWELLING, MASS AND LUMP, LEFT LOWER LIMB: ICD-10-CM

## 2022-12-05 PROCEDURE — 76882 US LMTD JT/FCL EVL NVASC XTR: CPT

## 2022-12-07 ENCOUNTER — TRANSCRIBE ORDERS (OUTPATIENT)
Dept: SCHEDULING | Age: 67
End: 2022-12-07

## 2022-12-07 DIAGNOSIS — Z01.812 ENCOUNTER FOR PREPROCEDURAL LABORATORY EXAMINATION: Primary | ICD-10-CM

## 2022-12-08 ENCOUNTER — APPOINTMENT (OUTPATIENT)
Dept: LAB | Age: 67
End: 2022-12-08
Attending: OPHTHALMOLOGY
Payer: MEDICARE

## 2022-12-08 DIAGNOSIS — Z01.812 ENCOUNTER FOR PREPROCEDURAL LABORATORY EXAMINATION: ICD-10-CM

## 2022-12-08 LAB — SARS-COV-2 RNA RESP QL NAA+PROBE: NEGATIVE

## 2022-12-08 PROCEDURE — C9803 HOPD COVID-19 SPEC COLLECT: HCPCS

## 2022-12-08 PROCEDURE — U0003 INFECTIOUS AGENT DETECTION BY NUCLEIC ACID (DNA OR RNA); SEVERE ACUTE RESPIRATORY SYNDROME CORONAVIRUS 2 (SARS-COV-2) (CORONAVIRUS DISEASE [COVID-19]), AMPLIFIED PROBE TECHNIQUE, MAKING USE OF HIGH THROUGHPUT TECHNOLOGIES AS DESCRIBED BY CMS-2020-01-R: HCPCS

## 2022-12-13 ENCOUNTER — HOSPITAL ENCOUNTER (OUTPATIENT)
Facility: HOSPITAL | Age: 67
Setting detail: HOSPITAL OUTPATIENT SURGERY
Discharge: HOME | End: 2022-12-13
Attending: OPHTHALMOLOGY | Admitting: OPHTHALMOLOGY
Payer: MEDICARE

## 2022-12-13 PROCEDURE — 36000003 HC OR LEVEL 3 INITIAL 30MIN: Performed by: OPHTHALMOLOGY

## 2022-12-13 PROCEDURE — 085J3ZZ DESTRUCTION OF RIGHT LENS, PERCUTANEOUS APPROACH: ICD-10-PCS | Performed by: OPHTHALMOLOGY

## 2022-12-13 PROCEDURE — 63700000 HC SELF-ADMINISTRABLE DRUG: Performed by: OPHTHALMOLOGY

## 2022-12-13 PROCEDURE — 25000000 HC PHARMACY GENERAL: Performed by: OPHTHALMOLOGY

## 2022-12-13 RX ORDER — TETRACAINE HYDROCHLORIDE 5 MG/ML
SOLUTION OPHTHALMIC
Status: DISCONTINUED | OUTPATIENT
Start: 2022-12-13 | End: 2022-12-13 | Stop reason: HOSPADM

## 2023-01-09 ENCOUNTER — OFFICE VISIT (OUTPATIENT)
Dept: SURGERY | Facility: CLINIC | Age: 68
End: 2023-01-09
Payer: MEDICARE

## 2023-01-09 VITALS — HEIGHT: 70 IN | BODY MASS INDEX: 27.2 KG/M2 | WEIGHT: 190 LBS

## 2023-01-09 DIAGNOSIS — R22.42 SUBCUTANEOUS MASS OF LEFT LOWER LEG: Primary | ICD-10-CM

## 2023-01-09 PROCEDURE — 99203 OFFICE O/P NEW LOW 30 MIN: CPT | Performed by: SURGERY

## 2023-01-09 PROCEDURE — G8756 NO BP MEASURE DOC: HCPCS | Performed by: SURGERY
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
## 2023-01-10 ENCOUNTER — TELEPHONE (OUTPATIENT)
Dept: SURGERY | Facility: CLINIC | Age: 68
End: 2023-01-10
Payer: MEDICARE

## 2023-01-10 NOTE — TELEPHONE ENCOUNTER
Spoke with the patient about his labs. Patient is not having a CT scan. Dr Thibodeaux did not order a CT scan prior to his procedure. Advised the patient that he only needs to have labs and an EKG done prior to his procedure on 3/14/2023. Patient expressed understanding.

## 2023-03-07 ENCOUNTER — APPOINTMENT (OUTPATIENT)
Dept: LAB | Age: 68
End: 2023-03-07
Attending: SURGERY
Payer: MEDICARE

## 2023-03-07 ENCOUNTER — HOSPITAL ENCOUNTER (OUTPATIENT)
Dept: CARDIOLOGY | Facility: HOSPITAL | Age: 68
Discharge: HOME | End: 2023-03-07
Attending: SURGERY
Payer: MEDICARE

## 2023-03-07 DIAGNOSIS — R22.42 SUBCUTANEOUS MASS OF LEFT LOWER LEG: ICD-10-CM

## 2023-03-07 LAB
ANION GAP SERPL CALC-SCNC: 8 MEQ/L (ref 3–15)
ATRIAL RATE: 67
BUN SERPL-MCNC: 8 MG/DL (ref 8–20)
CALCIUM SERPL-MCNC: 9.4 MG/DL (ref 8.9–10.3)
CHLORIDE SERPL-SCNC: 104 MEQ/L (ref 98–109)
CO2 SERPL-SCNC: 26 MEQ/L (ref 22–32)
CREAT SERPL-MCNC: 0.9 MG/DL (ref 0.8–1.3)
ERYTHROCYTE [DISTWIDTH] IN BLOOD BY AUTOMATED COUNT: 12.9 % (ref 11.6–14.4)
GFR SERPL CREATININE-BSD FRML MDRD: >60 ML/MIN/1.73M*2
GLUCOSE SERPL-MCNC: 129 MG/DL (ref 70–99)
HCT VFR BLDCO AUTO: 44.6 % (ref 40.1–51)
HGB BLD-MCNC: 14.3 G/DL (ref 13.7–17.5)
MCH RBC QN AUTO: 31.8 PG (ref 28–33.2)
MCHC RBC AUTO-ENTMCNC: 32.1 G/DL (ref 32.2–36.5)
MCV RBC AUTO: 99.1 FL (ref 83–98)
P AXIS: 46
PDW BLD AUTO: 10.9 FL (ref 9.4–12.4)
PLATELET # BLD AUTO: 225 K/UL (ref 150–350)
POTASSIUM SERPL-SCNC: 4.3 MEQ/L (ref 3.6–5.1)
PR INTERVAL: 192
QRS DURATION: 112
QT INTERVAL: 438
QTC CALCULATION(BAZETT): 462
R AXIS: -67
RBC # BLD AUTO: 4.5 M/UL (ref 4.5–5.8)
SODIUM SERPL-SCNC: 138 MEQ/L (ref 136–144)
T WAVE AXIS: 44
VENTRICULAR RATE: 67
WBC # BLD AUTO: 4.94 K/UL (ref 3.8–10.5)

## 2023-03-07 PROCEDURE — 36415 COLL VENOUS BLD VENIPUNCTURE: CPT

## 2023-03-07 PROCEDURE — 85027 COMPLETE CBC AUTOMATED: CPT

## 2023-03-07 PROCEDURE — 93010 ELECTROCARDIOGRAM REPORT: CPT | Performed by: INTERNAL MEDICINE

## 2023-03-07 PROCEDURE — 93005 ELECTROCARDIOGRAM TRACING: CPT

## 2023-03-07 PROCEDURE — 80048 BASIC METABOLIC PNL TOTAL CA: CPT

## 2023-03-14 PROCEDURE — 27339 EXC THIGH/KNEE TUM DEP 5CM/>: CPT | Mod: LT | Performed by: SURGERY

## 2023-03-14 PROCEDURE — 88304 TISSUE EXAM BY PATHOLOGIST: CPT | Performed by: SURGERY

## 2023-03-14 RX ORDER — OXYCODONE AND ACETAMINOPHEN 5; 325 MG/1; MG/1
1 TABLET ORAL EVERY 4 HOURS PRN
Qty: 20 TABLET | Refills: 0 | Status: SHIPPED | OUTPATIENT
Start: 2023-03-14

## 2023-03-15 ENCOUNTER — LAB REQUISITION (OUTPATIENT)
Dept: LAB | Facility: HOSPITAL | Age: 68
End: 2023-03-15
Attending: SURGERY
Payer: MEDICARE

## 2023-03-15 DIAGNOSIS — R22.2 LOCALIZED SWELLING, MASS AND LUMP, TRUNK: ICD-10-CM

## 2023-03-17 ENCOUNTER — TELEPHONE (OUTPATIENT)
Dept: SURGERY | Facility: CLINIC | Age: 68
End: 2023-03-17
Payer: MEDICARE

## 2023-03-17 LAB
CASE RPRT: NORMAL
CLINICAL INFO: NORMAL
PATH REPORT.FINAL DX SPEC: NORMAL
PATH REPORT.FINAL DX SPEC: NORMAL
PATH REPORT.GROSS SPEC: NORMAL

## 2023-12-13 ENCOUNTER — TRANSCRIBE ORDERS (OUTPATIENT)
Dept: RADIOLOGY | Facility: HOSPITAL | Age: 68
End: 2023-12-13

## 2023-12-13 ENCOUNTER — HOSPITAL ENCOUNTER (OUTPATIENT)
Dept: RADIOLOGY | Facility: HOSPITAL | Age: 68
Discharge: HOME | End: 2023-12-13
Attending: PHYSICIAN ASSISTANT
Payer: MEDICARE

## 2023-12-13 DIAGNOSIS — R06.02 SHORTNESS OF BREATH: Primary | ICD-10-CM

## 2023-12-13 DIAGNOSIS — R06.02 SHORTNESS OF BREATH: ICD-10-CM

## 2023-12-13 DIAGNOSIS — R05.1 ACUTE COUGH: ICD-10-CM

## 2023-12-13 PROCEDURE — 71046 X-RAY EXAM CHEST 2 VIEWS: CPT

## 2024-01-19 ENCOUNTER — TRANSCRIBE ORDERS (OUTPATIENT)
Dept: REGISTRATION | Facility: HOSPITAL | Age: 69
End: 2024-01-19

## 2024-01-19 DIAGNOSIS — J20.9 ACUTE BRONCHITIS, UNSPECIFIED: Primary | ICD-10-CM

## 2024-06-02 ENCOUNTER — HOSPITAL ENCOUNTER (EMERGENCY)
Facility: HOSPITAL | Age: 69
Discharge: HOME | End: 2024-06-02
Attending: EMERGENCY MEDICINE | Admitting: EMERGENCY MEDICINE
Payer: MEDICARE

## 2024-06-02 ENCOUNTER — APPOINTMENT (EMERGENCY)
Dept: RADIOLOGY | Facility: HOSPITAL | Age: 69
End: 2024-06-02
Payer: MEDICARE

## 2024-06-02 VITALS
DIASTOLIC BLOOD PRESSURE: 69 MMHG | TEMPERATURE: 97.3 F | HEART RATE: 63 BPM | RESPIRATION RATE: 19 BRPM | WEIGHT: 195 LBS | BODY MASS INDEX: 27.92 KG/M2 | SYSTOLIC BLOOD PRESSURE: 146 MMHG | HEIGHT: 70 IN | OXYGEN SATURATION: 97 %

## 2024-06-02 DIAGNOSIS — R42 DIZZINESS: Primary | ICD-10-CM

## 2024-06-02 DIAGNOSIS — R42 LIGHTHEADEDNESS: ICD-10-CM

## 2024-06-02 LAB
ALBUMIN SERPL-MCNC: 4.4 G/DL (ref 3.5–5.7)
ALP SERPL-CCNC: 50 IU/L (ref 34–125)
ALT SERPL-CCNC: 19 IU/L (ref 7–52)
ANION GAP SERPL CALC-SCNC: 7 MEQ/L (ref 3–15)
AST SERPL-CCNC: 24 IU/L (ref 13–39)
BASOPHILS # BLD: 0.04 K/UL (ref 0.01–0.1)
BASOPHILS NFR BLD: 0.4 %
BILIRUB SERPL-MCNC: 0.8 MG/DL (ref 0.3–1.2)
BUN SERPL-MCNC: 12 MG/DL (ref 7–25)
CALCIUM SERPL-MCNC: 9.2 MG/DL (ref 8.6–10.3)
CHLORIDE SERPL-SCNC: 104 MEQ/L (ref 98–107)
CO2 SERPL-SCNC: 27 MEQ/L (ref 21–31)
CREAT SERPL-MCNC: 1 MG/DL (ref 0.7–1.3)
DIFFERENTIAL METHOD BLD: ABNORMAL
EGFRCR SERPLBLD CKD-EPI 2021: >60 ML/MIN/1.73M*2
EOSINOPHIL # BLD: 0.02 K/UL (ref 0.04–0.54)
EOSINOPHIL NFR BLD: 0.2 %
ERYTHROCYTE [DISTWIDTH] IN BLOOD BY AUTOMATED COUNT: 13.5 % (ref 11.6–14.4)
FLUAV RNA SPEC QL NAA+PROBE: NEGATIVE
FLUBV RNA SPEC QL NAA+PROBE: NEGATIVE
GLUCOSE SERPL-MCNC: 102 MG/DL (ref 70–99)
HCT VFR BLD AUTO: 45.5 % (ref 40.1–51)
HGB BLD-MCNC: 14.9 G/DL (ref 13.7–17.5)
IMM GRANULOCYTES # BLD AUTO: 0.02 K/UL (ref 0–0.08)
IMM GRANULOCYTES NFR BLD AUTO: 0.2 %
LYMPHOCYTES # BLD: 1.56 K/UL (ref 1.2–3.5)
LYMPHOCYTES NFR BLD: 16.9 %
MCH RBC QN AUTO: 31.8 PG (ref 28–33.2)
MCHC RBC AUTO-ENTMCNC: 32.7 G/DL (ref 32.2–36.5)
MCV RBC AUTO: 97 FL (ref 83–98)
MONOCYTES # BLD: 0.68 K/UL (ref 0.3–1)
MONOCYTES NFR BLD: 7.4 %
NEUTROPHILS # BLD: 6.89 K/UL (ref 1.7–7)
NEUTS SEG NFR BLD: 74.9 %
NRBC BLD-RTO: 0 %
PDW BLD AUTO: 10.4 FL (ref 9.4–12.4)
PLATELET # BLD AUTO: 225 K/UL (ref 150–350)
POTASSIUM SERPL-SCNC: 4 MEQ/L (ref 3.5–5.1)
PROT SERPL-MCNC: 7.8 G/DL (ref 6–8.2)
RBC # BLD AUTO: 4.69 M/UL (ref 4.5–5.8)
RSV RNA SPEC QL NAA+PROBE: NEGATIVE
SARS-COV-2 RNA RESP QL NAA+PROBE: NEGATIVE
SODIUM SERPL-SCNC: 138 MEQ/L (ref 136–145)
TROPONIN I SERPL HS-MCNC: 6 PG/ML
WBC # BLD AUTO: 9.21 K/UL (ref 3.8–10.5)

## 2024-06-02 PROCEDURE — 25800000 HC PHARMACY IV SOLUTIONS: Performed by: EMERGENCY MEDICINE

## 2024-06-02 PROCEDURE — 80053 COMPREHEN METABOLIC PANEL: CPT | Performed by: EMERGENCY MEDICINE

## 2024-06-02 PROCEDURE — 71045 X-RAY EXAM CHEST 1 VIEW: CPT

## 2024-06-02 PROCEDURE — 36415 COLL VENOUS BLD VENIPUNCTURE: CPT

## 2024-06-02 PROCEDURE — 85025 COMPLETE CBC W/AUTO DIFF WBC: CPT

## 2024-06-02 PROCEDURE — 84484 ASSAY OF TROPONIN QUANT: CPT | Performed by: EMERGENCY MEDICINE

## 2024-06-02 PROCEDURE — 93005 ELECTROCARDIOGRAM TRACING: CPT | Performed by: EMERGENCY MEDICINE

## 2024-06-02 PROCEDURE — 85025 COMPLETE CBC W/AUTO DIFF WBC: CPT | Performed by: EMERGENCY MEDICINE

## 2024-06-02 PROCEDURE — 87637 SARSCOV2&INF A&B&RSV AMP PRB: CPT | Performed by: EMERGENCY MEDICINE

## 2024-06-02 PROCEDURE — 96360 HYDRATION IV INFUSION INIT: CPT

## 2024-06-02 PROCEDURE — 3E0337Z INTRODUCTION OF ELECTROLYTIC AND WATER BALANCE SUBSTANCE INTO PERIPHERAL VEIN, PERCUTANEOUS APPROACH: ICD-10-PCS | Performed by: EMERGENCY MEDICINE

## 2024-06-02 PROCEDURE — 93005 ELECTROCARDIOGRAM TRACING: CPT

## 2024-06-02 PROCEDURE — 99284 EMERGENCY DEPT VISIT MOD MDM: CPT | Mod: 25

## 2024-06-02 RX ADMIN — SODIUM CHLORIDE 500 ML: 9 INJECTION, SOLUTION INTRAVENOUS at 17:27

## 2024-06-02 ASSESSMENT — ENCOUNTER SYMPTOMS
VOMITING: 0
FATIGUE: 0
HEADACHES: 0
DIAPHORESIS: 0
DIARRHEA: 0
SHORTNESS OF BREATH: 0
DIZZINESS: 1
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
## 2024-06-02 NOTE — DISCHARGE INSTRUCTIONS
Please return to the ER for any fever/chills/nausea/vomiting.  Please follow-up with your primary care doctor for further evaluation and treatment

## 2024-06-03 LAB
ATRIAL RATE: 68
P AXIS: 27
PR INTERVAL: 202
QRS DURATION: 100
QT INTERVAL: 436
QTC CALCULATION(BAZETT): 463
R AXIS: -64
T WAVE AXIS: 46
VENTRICULAR RATE: 68

## 2024-06-03 PROCEDURE — 93010 ELECTROCARDIOGRAM REPORT: CPT | Performed by: INTERNAL MEDICINE

## 2024-09-19 ENCOUNTER — TELEPHONE (OUTPATIENT)
Dept: SURGERY | Facility: CLINIC | Age: 69
End: 2024-09-19
Payer: MEDICARE

## 2024-09-23 ENCOUNTER — OFFICE VISIT (OUTPATIENT)
Dept: SURGERY | Facility: CLINIC | Age: 69
End: 2024-09-23
Payer: MEDICARE

## 2024-09-23 VITALS
SYSTOLIC BLOOD PRESSURE: 140 MMHG | DIASTOLIC BLOOD PRESSURE: 74 MMHG | HEIGHT: 70 IN | WEIGHT: 198.6 LBS | TEMPERATURE: 98.4 F | OXYGEN SATURATION: 98 % | HEART RATE: 71 BPM | BODY MASS INDEX: 28.43 KG/M2

## 2024-09-23 DIAGNOSIS — N50.89 SCROTAL MASS: Primary | ICD-10-CM

## 2024-09-23 DIAGNOSIS — R19.00 ABDOMINAL WALL BULGE: ICD-10-CM

## 2024-09-23 DIAGNOSIS — M62.08 DIASTASIS RECTI: ICD-10-CM

## 2024-09-23 PROCEDURE — 99213 OFFICE O/P EST LOW 20 MIN: CPT | Performed by: SURGERY

## 2024-09-23 PROCEDURE — G8753 SYS BP > OR = 140: HCPCS | Performed by: SURGERY

## 2024-09-23 PROCEDURE — G8754 DIAS BP LESS 90: HCPCS | Performed by: SURGERY

## 2024-09-23 NOTE — PROGRESS NOTES
Patient ID: Julian Vazquez                              : 1955  MRN: [id number]                                            Visit Date: 2024  Encounter Provider: Davin Thibodeaux  Referring Provider: No ref. provider found    Subjective   Chief Complaint: Abdominal wall bulge      HPI: Julian Vazquez is a 68 y.o. old male   with a past medical history as noted below who comes in today in consultation today for an abdominal wall bulge that she recently noted.  It does not cause any pain or discomfort we thought it might represent a hernia.  He denies any bladder or bowel obstructive symptoms.     Medications:   Current Outpatient Medications:     amLODIPine (NORVASC) 5 mg tablet, Take 5 mg by mouth daily., Disp: , Rfl:     colestipoL (COLESTID) 1 gram tablet, Take 1 g by mouth daily., Disp: , Rfl:     latanoprost (XALATAN) 0.005 % ophthalmic solution, Administer 1 drop into the right eye nightly.  , Disp: , Rfl: 5    rosuvastatin (CRESTOR) 40 mg tablet, Take 40 mg by mouth nightly., Disp: , Rfl:     albuterol HFA (VENTOLIN HFA) 90 mcg/actuation inhaler, Inhale 2 puffs every 6 (six) hours as needed for wheezing or shortness of breath. (Patient not taking: Reported on 2024), Disp: 18 g, Rfl: 1    oxyCODONE-acetaminophen (PERCOCET) 5-325 mg per tablet, Take 1 tablet by mouth every 4 (four) hours as needed for moderate pain for up to 20 doses. (Patient not taking: Reported on 2024), Disp: 20 tablet, Rfl: 0    Past Medical History:  has a past medical history of Glaucoma, Hypertension, and Lipid disorder.  Past Surgical History:  has a past surgical history that includes Hand surgery (Right).  Social History:   Social History     Tobacco Use    Smoking status: Never    Smokeless tobacco: Never   Vaping Use    Vaping status: Never Used   Substance Use Topics    Alcohol use: Yes     Comment: socially    Drug use: No      Family History: family history is not on file.   Allergies: has  "No Known Allergies.     Review of Systems: A complete  review of systems is negative except as noted above  The following have been reviewed and updated as appropriate in this visit:          Objective   Vitals: Visit Vitals  BP (!) 140/74   Pulse 71   Temp 36.9 °C (98.4 °F)   Ht 1.778 m (5' 10\")   Wt 90.1 kg (198 lb 9.6 oz)   SpO2 98%   BMI 28.50 kg/m²     Physical Exam      Heart: Regular rate and rhythm with no murmurs, rubs, or gallops.  Normal S1, S2  Lungs: Clear to auscultation bilaterally with no wheezes, rales, or rhonchi.  Nonlabored respirations.  Abdomen: Soft, nontender and nondistended with no palpable organomegaly or other masses noted.  Good bowel sounds noted.  There is a large diastases recti noted when he goes to sit up extending from his umbilicus to his xiphoid process.    Genitourinary: Testes are descended.  There does appear to be a small nodule palpable above the right testicle.  I could not feel hernia on either side.  There were no left scrotal masses.    Assessment: Diastases recti  Right scrotal mass    Plan: I did explain to Julian that he has a diastases recti and that this does not require surgical repair.  He was unaware of the right scrotal mass I have recommended that the be evaluated by urologist for this and did order a scrotal ultrasound for him.    Davin Thibodeaux MD  "

## 2024-09-23 NOTE — LETTER
2024     Kyler Mayo, DO  [address]  [address]  [place] [address]    Patient: Julian Vazquez  YOB: 1955  Date of Visit: 2024      Dear Dr. Mayo:    Thank you for referring Julian Vazquez to me for evaluation. Below are my notes for this consultation.    If you have questions, please do not hesitate to call me. I look forward to following your patient along with you.         Sincerely,        Davin Thibodeaux MD        CC: MD Carlos Eduardo Kimbrough, Davin OLIVARES MD  2024  4:01 PM  Sign when Signing Visit  Patient ID: Julian Vazquez                              : 1955  MRN: [id number]                                            Visit Date: 2024  Encounter Provider: Davin Thibodeaux  Referring Provider: No ref. provider found    Subjective  Chief Complaint: Abdominal wall bulge      HPI: Julian Vazquez is a 68 y.o. old male   with a past medical history as noted below who comes in today in consultation today for an abdominal wall bulge that she recently noted.  It does not cause any pain or discomfort we thought it might represent a hernia.  He denies any bladder or bowel obstructive symptoms.     Medications:   Current Outpatient Medications:   •  amLODIPine (NORVASC) 5 mg tablet, Take 5 mg by mouth daily., Disp: , Rfl:   •  colestipoL (COLESTID) 1 gram tablet, Take 1 g by mouth daily., Disp: , Rfl:   •  latanoprost (XALATAN) 0.005 % ophthalmic solution, Administer 1 drop into the right eye nightly.  , Disp: , Rfl: 5  •  rosuvastatin (CRESTOR) 40 mg tablet, Take 40 mg by mouth nightly., Disp: , Rfl:   •  albuterol HFA (VENTOLIN HFA) 90 mcg/actuation inhaler, Inhale 2 puffs every 6 (six) hours as needed for wheezing or shortness of breath. (Patient not taking: Reported on 2024), Disp: 18 g, Rfl: 1  •  oxyCODONE-acetaminophen (PERCOCET) 5-325 mg per tablet, Take 1 tablet by mouth every 4 (four) hours as needed for  "moderate pain for up to 20 doses. (Patient not taking: Reported on 9/23/2024), Disp: 20 tablet, Rfl: 0    Past Medical History:  has a past medical history of Glaucoma, Hypertension, and Lipid disorder.  Past Surgical History:  has a past surgical history that includes Hand surgery (Right).  Social History:   Social History     Tobacco Use   • Smoking status: Never   • Smokeless tobacco: Never   Vaping Use   • Vaping status: Never Used   Substance Use Topics   • Alcohol use: Yes     Comment: socially   • Drug use: No      Family History: family history is not on file.   Allergies: has No Known Allergies.     Review of Systems: A complete  review of systems is negative except as noted above  The following have been reviewed and updated as appropriate in this visit:          Objective  Vitals: Visit Vitals  BP (!) 140/74   Pulse 71   Temp 36.9 °C (98.4 °F)   Ht 1.778 m (5' 10\")   Wt 90.1 kg (198 lb 9.6 oz)   SpO2 98%   BMI 28.50 kg/m²     Physical Exam      Heart: Regular rate and rhythm with no murmurs, rubs, or gallops.  Normal S1, S2  Lungs: Clear to auscultation bilaterally with no wheezes, rales, or rhonchi.  Nonlabored respirations.  Abdomen: Soft, nontender and nondistended with no palpable organomegaly or other masses noted.  Good bowel sounds noted.  There is a large diastases recti noted when he goes to sit up extending from his umbilicus to his xiphoid process.    Genitourinary: Testes are descended.  There does appear to be a small nodule palpable above the right testicle.  I could not feel hernia on either side.  There were no left scrotal masses.    Assessment: Diastases recti  Right scrotal mass    Plan: I did explain to Julian that he has a diastases recti and that this does not require surgical repair.  He was unaware of the right scrotal mass I have recommended that the be evaluated by urologist for this and did order a scrotal ultrasound for him.    Davin Thibodeaux MD  "

## 2024-09-25 ENCOUNTER — HOSPITAL ENCOUNTER (OUTPATIENT)
Dept: RADIOLOGY | Facility: HOSPITAL | Age: 69
Discharge: HOME | End: 2024-09-25
Attending: SURGERY
Payer: MEDICARE

## 2024-09-25 DIAGNOSIS — N50.89 SCROTAL MASS: ICD-10-CM

## 2024-09-25 PROCEDURE — 76870 US EXAM SCROTUM: CPT

## 2024-09-26 NOTE — TELEPHONE ENCOUNTER
I did call Julian with the results of his scrotal ultrasound below.    Right testicle: Normal in size and echotexture. Normal vascularity on color  Doppler. No mass.  Testicular size: 2.8 cm x 3.4 cm x 4.7 cm = 22.8  mL  Right epididymis: Visualized portions are normal. Normal vascularity on color  Doppler.  Epididymal head size: 1.7 cm x 2.2 cm x 2.0 cm.  Multiple simple and mildly  complex right epididymal cysts (most pronounced in the epididymal head) are  again noted.  The largest individual cyst measures approximately 2.8 cm.  Other: No hydrocele. No varicocele.     LEFT:  Left testicle: Normal in size and echotexture. Normal vascularity on color  Doppler. No suspicious mass.  The miniscule simple cyst noted  Testicular size: 2.5 cm x 2.7 cm  x 5.0 cm = 17.6 milliliter  Left epididymis: Visualized portions are normal. Normal vascularity on color  Doppler.  Epididymal head size: 1.4 cm x 1.2 cm x 1.1 cm  Other: No hydrocele. No varicocele.     --  IMPRESSION:  Multiple right epididymal cysts/spermatocele, similar to previous.    He does have an appointment scheduled with Dr. Meyers at their [place] office

## 2025-02-21 ENCOUNTER — HOSPITAL ENCOUNTER (OUTPATIENT)
Facility: CLINIC | Age: 70
Discharge: HOME | End: 2025-02-21
Attending: FAMILY MEDICINE
Payer: MEDICARE

## 2025-02-21 ENCOUNTER — APPOINTMENT (OUTPATIENT)
Dept: RADIOLOGY | Age: 70
End: 2025-02-21
Payer: MEDICARE

## 2025-02-21 VITALS
TEMPERATURE: 98.5 F | SYSTOLIC BLOOD PRESSURE: 168 MMHG | DIASTOLIC BLOOD PRESSURE: 81 MMHG | OXYGEN SATURATION: 96 % | HEART RATE: 72 BPM

## 2025-02-21 DIAGNOSIS — J06.9 ACUTE URI: Primary | ICD-10-CM

## 2025-02-21 DIAGNOSIS — I49.9 IRREGULAR HEART BEAT: ICD-10-CM

## 2025-02-21 PROCEDURE — 99214 OFFICE O/P EST MOD 30 MIN: CPT | Performed by: FAMILY MEDICINE

## 2025-02-21 PROCEDURE — 71046 X-RAY EXAM CHEST 2 VIEWS: CPT

## 2025-02-21 PROCEDURE — 93000 ELECTROCARDIOGRAM COMPLETE: CPT

## 2025-02-21 RX ORDER — BROMFENAC 1.03 MG/ML
SOLUTION/ DROPS OPHTHALMIC
COMMUNITY
Start: 2025-01-31

## 2025-02-21 ASSESSMENT — ENCOUNTER SYMPTOMS
BACK PAIN: 0
WEAKNESS: 0
MYALGIAS: 0
NUMBNESS: 0
SORE THROAT: 0
NECK PAIN: 0
NECK STIFFNESS: 0
HEADACHES: 0
DIARRHEA: 0
PALPITATIONS: 0
SINUS PAIN: 0
RHINORRHEA: 1
LIGHT-HEADEDNESS: 0
COUGH: 1
CHEST TIGHTNESS: 0
WHEEZING: 0
CHILLS: 0
DIZZINESS: 0
VOMITING: 0
COLOR CHANGE: 0
EYES NEGATIVE: 1
SHORTNESS OF BREATH: 0
SINUS PRESSURE: 0
ABDOMINAL PAIN: 0
TROUBLE SWALLOWING: 0
NAUSEA: 0
FATIGUE: 1
FEVER: 0

## 2025-02-21 NOTE — ED ATTESTATION NOTE
Reviewed  and supervised the care ,I agreed with the assessment and plan       Elizabeth Holcomb MD  02/21/25 0547
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
## 2025-02-21 NOTE — DISCHARGE INSTRUCTIONS
Your blood pressure was elevated today. Please schedule a follow up with your primary care physician for a blood pressure check within 1-2 weeks.      Your chest x-ray was normal and did not show any signs of pneumonia.  Continue with the suggested over-the-counter medications for your symptoms below.  Please follow-up with your cardiologist regarding the sensation of your heart skipping beats or stopping temporarily.  For any new/worsening symptoms, or if you feel this sensation again, please go directly to the emergency department for further evaluation and treatment.    Symptomatic Therapy for Viral Upper Respiratory Infections and Seasonal Allergies:  Drink plenty of fluids  Use Nasal saline spray (Flonase- 1 spray each nostril in the morning and again at night for 1 week, then once a day after) or a Neti-Pot can be helpful to reduce congestion and post-nasal drainage  Oral decongestants such as Sudafed or Mucinex to help with congestion.  Do not use these medications if you have poorly controlled high blood pressure or other heart problems (Coricidin is safe to use if you have high blood pressure).  Oral antihistamines such as claritin, zyrtec, allegra may be used for watery eyes or nose, especially if you were diagnosed with seasonal allergies  Delsym or Robitussin, you can use Tussin if you have high blood pressure or diabetes.   Pain relievers such as motrin or tylenol for fever, aches and pains  Eustachian Tube Dysfunction: Check out the Otovent or Eustaci for relief of discomfort from inner ear fluid build-up.    If you have a humidifier, place it next to your bed at night to help loosen any secretions while you sleep.  Utilize warm steam showers to help move mucus out.     Seek re-evaluation for the following:  New onset of fever greater than 101 F  New onset of facial pain  Severe worsening of your pain, chest pain, difficulty breathing, severe nausea/vomiting, inability to drink/eat  Passing out, change  in mental status (confusion/lethargy), new numbness/weakness  New or worsening symptoms despite the use of OTC remedies or illness lasting greater than 7 days

## 2025-03-18 ENCOUNTER — TRANSCRIBE ORDERS (OUTPATIENT)
Dept: REGISTRATION | Facility: HOSPITAL | Age: 70
End: 2025-03-18

## 2025-03-18 DIAGNOSIS — N44.2 BENIGN CYST OF TESTIS: Primary | ICD-10-CM

## 2025-03-20 ENCOUNTER — HOSPITAL ENCOUNTER (OUTPATIENT)
Dept: RADIOLOGY | Age: 70
Discharge: HOME | End: 2025-03-20
Attending: INTERNAL MEDICINE
Payer: MEDICARE

## 2025-03-20 DIAGNOSIS — N44.2 BENIGN CYST OF TESTIS: ICD-10-CM

## 2025-03-20 PROCEDURE — 76870 US EXAM SCROTUM: CPT

## 2025-04-07 ENCOUNTER — APPOINTMENT (OUTPATIENT)
Dept: RADIOLOGY | Age: 70
End: 2025-04-07
Attending: FAMILY MEDICINE
Payer: MEDICARE

## 2025-04-07 ENCOUNTER — HOSPITAL ENCOUNTER (OUTPATIENT)
Facility: CLINIC | Age: 70
Discharge: HOME | End: 2025-04-07
Attending: FAMILY MEDICINE
Payer: MEDICARE

## 2025-04-07 VITALS
TEMPERATURE: 98.2 F | OXYGEN SATURATION: 97 % | HEART RATE: 80 BPM | SYSTOLIC BLOOD PRESSURE: 126 MMHG | DIASTOLIC BLOOD PRESSURE: 80 MMHG

## 2025-04-07 DIAGNOSIS — J40 BRONCHITIS: Primary | ICD-10-CM

## 2025-04-07 LAB
EXPIRATION DATE: NORMAL
Lab: NORMAL
POCT MANUFACTURER: NORMAL
RAPID INFLUENZA A AGN: NEGATIVE
RAPID INFLUENZA B AGN: NEGATIVE

## 2025-04-07 PROCEDURE — 71046 X-RAY EXAM CHEST 2 VIEWS: CPT | Performed by: FAMILY MEDICINE

## 2025-04-07 PROCEDURE — 99214 OFFICE O/P EST MOD 30 MIN: CPT | Performed by: FAMILY MEDICINE

## 2025-04-07 PROCEDURE — 87804 INFLUENZA ASSAY W/OPTIC: CPT | Mod: QW | Performed by: FAMILY MEDICINE

## 2025-04-07 RX ORDER — DOXYCYCLINE 100 MG/1
100 CAPSULE ORAL 2 TIMES DAILY
Qty: 14 CAPSULE | Refills: 0 | Status: SHIPPED | OUTPATIENT
Start: 2025-04-07 | End: 2025-04-14

## 2025-04-07 RX ORDER — ALBUTEROL SULFATE 90 UG/1
2 INHALANT RESPIRATORY (INHALATION) EVERY 6 HOURS PRN
Qty: 18 G | Refills: 0 | Status: SHIPPED | OUTPATIENT
Start: 2025-04-07 | End: 2025-05-07

## 2025-04-07 ASSESSMENT — ENCOUNTER SYMPTOMS
DIZZINESS: 0
COUGH: 1
HEADACHES: 0
WHEEZING: 1
MYALGIAS: 0
FEVER: 1
CHILLS: 1
FLU SYMPTOMS: 1
SHORTNESS OF BREATH: 0
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
## 2025-04-07 NOTE — DISCHARGE INSTRUCTIONS
Rapid flu negative  Prescribed doxycycline 100mg, one tablet twice daily for 7 days, this medication can make you sun sensitive so a hat and sunscreen outdoors  Prescribed albuterol inhaler, 2 puffs every 4-6 hours as needed for wheezing  Mucinex DM as needed for cough  Follow-up with primary care physician if symptoms persist

## 2025-04-18 ENCOUNTER — PRE-ADMISSION TESTING (OUTPATIENT)
Dept: PREADMISSION TESTING | Age: 70
End: 2025-04-18
Payer: MEDICARE

## 2025-04-18 VITALS — HEIGHT: 70 IN | WEIGHT: 195 LBS | BODY MASS INDEX: 27.92 KG/M2

## 2025-04-18 NOTE — PRE-PROCEDURE INSTRUCTIONS
[address] [address]    1.       You will be called between 1pm-5pm one business day before your procedure to tell you where and when to report. If you do not receive a phone call by 6pm, please call the Operating Room at [phone].    2.        Please report to Surgery Registration on the day of your procedure. You can park in the east garage, enter the front doors of the new Pavilion, and take the [place] elevators to the second floor. Follow signs to Surgery Registration.       3. Please follow the following fasting guidelines:     No solid food EIGHT HOURS prior to arrival time on day of surgery.  6 ounces of clear liquids, meaning water or PLAIN black coffee WITHOUT any milk, cream, sugar, or sweetener are permitted up to TWO HOURS prior to arrival at the hospital.    4. Please take ONLY the following medications with a sip of water on the morning of your procedure: Amlodipine  Use your inhaler if needed and bring to hospital  Use your eye drops   Hold vitamins and supplements 7 days prior to surgery   Hold NSAIDS ibuprofen, advil, aleve motrin  7 days prior to surgery  you may take tylenol if needed    5. Other Instructions: You may brush your teeth the morning of the procedure. Rinse and spit, do not swallow.  Bring a list of your medications with dosages.  Use surgical wash as directed.     6. If you develop a cold, cough, fever, rash, or other symptom prior to the day of the procedure, please report it to your physician immediately.    7. If you need to cancel the procedure for any reason, please contact your physician.    8. Make arrangements to have safe transportation home accompanied by a responsible adult. If you have not arranged safe transportation home, your surgery will be cancelled. Safe transportation may include private vehicle, ride-share service, taxi and public transportation when accompanied by a responsible adult who will assist you home. A responsible  adult is someone known to you and does not include the taxi, ride-share or public transit drive transporting you.    9.  If it is medically necessary for you to have a longer stay, you will be informed as soon as the decision is made.    10. Only bring essential items to the hospital.  Do not wear or bring anything of value to the hospital including jewelry of any kind, money, or wallet. Do not wear make-up or contact lenses.  DO NOT BRING MEDICATIONS FROM HOME unless instructed to do so. DO bring your hearing aids, glasses, and a case    11. No lotion, creams, powders, or oils on skin the morning of procedure     12. Dress in comfortable clothes.    13.  If instructed, please bring a copy of your Advanced Directive (Living Will/Durable Power of ) on the day of your procedure.     14. Ensuring your safety at all times is a very important part of our [facility] Culture of Safety. After having surgery and sedation, you are at risk for falling and balance issues. Although you may feel awake, the effects of the medication can last up to 24 hours after anesthesia. If you need to use the bathroom during your recovery period, nursing staff will escort you there and stay with you to ensure your safety.    15. Refrain from drinking alcohol and smoking cigarettes for 24 hours prior to surgery.    16. Shower with antibacterial soap (Dial) the night before and morning of your procedure.      Above instructions reviewed with patient and patient acknowledges understanding.    Form explained by: Jenn Maddox RN

## 2025-04-23 ENCOUNTER — PRE-ADMISSION TESTING (OUTPATIENT)
Dept: PREADMISSION TESTING | Facility: HOSPITAL | Age: 70
End: 2025-04-23
Attending: SURGERY
Payer: MEDICARE

## 2025-04-23 ENCOUNTER — APPOINTMENT (OUTPATIENT)
Dept: LAB | Facility: HOSPITAL | Age: 70
End: 2025-04-23
Attending: SURGERY
Payer: MEDICARE

## 2025-04-23 ENCOUNTER — TRANSCRIBE ORDERS (OUTPATIENT)
Dept: REGISTRATION | Facility: HOSPITAL | Age: 70
End: 2025-04-23

## 2025-04-23 ENCOUNTER — HOSPITAL ENCOUNTER (OUTPATIENT)
Dept: CARDIOLOGY | Facility: HOSPITAL | Age: 70
Discharge: HOME | End: 2025-04-23
Attending: SURGERY
Payer: MEDICARE

## 2025-04-23 VITALS
WEIGHT: 195.2 LBS | SYSTOLIC BLOOD PRESSURE: 157 MMHG | TEMPERATURE: 98.1 F | DIASTOLIC BLOOD PRESSURE: 70 MMHG | RESPIRATION RATE: 16 BRPM | OXYGEN SATURATION: 96 % | HEART RATE: 64 BPM | BODY MASS INDEX: 27.94 KG/M2 | HEIGHT: 70 IN

## 2025-04-23 DIAGNOSIS — H43.392 VITREOUS FLOATERS, LEFT: Primary | ICD-10-CM

## 2025-04-23 DIAGNOSIS — H43.392 VITREOUS FLOATERS, LEFT: ICD-10-CM

## 2025-04-23 DIAGNOSIS — Z01.818 PREOP EXAMINATION: Primary | ICD-10-CM

## 2025-04-23 LAB
ANION GAP SERPL CALC-SCNC: 8 MEQ/L (ref 3–15)
BUN SERPL-MCNC: 9 MG/DL (ref 7–25)
CALCIUM SERPL-MCNC: 9.1 MG/DL (ref 8.6–10.3)
CHLORIDE SERPL-SCNC: 103 MEQ/L (ref 98–107)
CO2 SERPL-SCNC: 25 MEQ/L (ref 21–31)
CREAT SERPL-MCNC: 0.8 MG/DL (ref 0.7–1.3)
EGFRCR SERPLBLD CKD-EPI 2021: >60 ML/MIN/1.73M*2
ERYTHROCYTE [DISTWIDTH] IN BLOOD BY AUTOMATED COUNT: 13.4 % (ref 11.6–14.4)
GLUCOSE SERPL-MCNC: 101 MG/DL (ref 70–99)
HCT VFR BLD AUTO: 42.7 % (ref 40.1–51)
HGB BLD-MCNC: 14.2 G/DL (ref 13.7–17.5)
MCH RBC QN AUTO: 31.3 PG (ref 28–33.2)
MCHC RBC AUTO-ENTMCNC: 33.3 G/DL (ref 32.2–36.5)
MCV RBC AUTO: 94.1 FL (ref 83–98)
PLATELET # BLD AUTO: 261 K/UL (ref 150–350)
PMV BLD AUTO: 10.7 FL (ref 9.4–12.4)
POTASSIUM SERPL-SCNC: 4.3 MEQ/L (ref 3.5–5.1)
RBC # BLD AUTO: 4.54 M/UL (ref 4.5–5.8)
SODIUM SERPL-SCNC: 136 MEQ/L (ref 136–145)
WBC # BLD AUTO: 5.67 K/UL (ref 3.8–10.5)

## 2025-04-23 PROCEDURE — 36415 COLL VENOUS BLD VENIPUNCTURE: CPT

## 2025-04-23 PROCEDURE — 80048 BASIC METABOLIC PNL TOTAL CA: CPT

## 2025-04-23 PROCEDURE — 85027 COMPLETE CBC AUTOMATED: CPT

## 2025-04-23 ASSESSMENT — PAIN SCALES - GENERAL: PAINLEVEL_OUTOF10: 0-NO PAIN

## 2025-04-23 NOTE — PRE-PROCEDURE INSTRUCTIONS
[address] [address]    1. You will be called between 1pm-5pm one business day before your procedure to tell you where and when to report. If you do not receive a phone call by 6pm, please call the Operating Room at [phone].    2. Please report to Surgery Registration on the day of your procedure. You can park in the east garage, enter the front doors of the new Pavilion, and take the [place] elevators to the second floor. Follow signs to Surgery Registration.         3. Please follow the following fasting guidelines:     No solid food EIGHT HOURS prior to arrival time on day of surgery.  6 ounces of clear liquids, meaning water or PLAIN black coffee WITHOUT any milk, cream, sugar, or sweetener are permitted up to TWO HOURS prior to arrival at the hospital.    4. Please take ONLY the following medications with a sip of water on the morning of your procedure: Albuterol inhaler (if needed), amlodipine    -> Hold vitamins, supplements, NSAIDs (Motrin, Advil, Aleve, ibuprofen) 1 week prior to your procedure.  You may take Tylenol if needed.  -> Bring your albuterol inhaler on the day of your procedure    5. Other Instructions: You may brush your teeth the morning of the procedure. Rinse and spit, do not swallow.  Bring a list of your medications with dosages.      6. If you develop a cold, cough, fever, rash, or other symptom prior to the day of the procedure, please report it to your physician immediately.    7. If you need to cancel the procedure for any reason, please contact your physician.    8. Make arrangements to have safe transportation home accompanied by a responsible adult. If you have not arranged safe transportation home, your surgery will be cancelled. Safe transportation may include private vehicle, ride-share service, taxi and public transportation when accompanied by a responsible adult who will assist you home. A responsible adult is someone known to you and  does not include the taxi, ride-share or public transit drive transporting you.    9.  If it is medically necessary for you to have a longer stay, you will be informed as soon as the decision is made.    10. Only bring essential items to the hospital.  Do not wear or bring anything of value to the hospital including jewelry of any kind, money, or wallet. Do not wear make-up or contact lenses.  DO NOT BRING MEDICATIONS FROM HOME unless instructed to do so. DO bring your hearing aids, glasses, and a case    11. No lotion, creams, powders, or oils on skin the morning of procedure     12. Dress in comfortable clothes.    13.  If instructed, please bring a copy of your Advanced Directive (Living Will/Durable Power of ) on the day of your procedure.     14. Ensuring your safety at all times is a very important part of our [facility] Culture of Safety. After having surgery and sedation, you are at risk for falling and balance issues. Although you may feel awake, the effects of the medication can last up to 24 hours after anesthesia. If you need to use the bathroom during your recovery period, nursing staff will escort you there and stay with you to ensure your safety.    15. Refrain from drinking alcohol and smoking cigarettes for 24 hours prior to surgery.    16. Shower with antibacterial soap (Dial) the night before and morning of your procedure.      Above instructions reviewed with patient and patient acknowledges understanding.    Form explained by: XIAO Tesfaye
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
## 2025-04-30 ENCOUNTER — HOSPITAL ENCOUNTER (OUTPATIENT)
Facility: HOSPITAL | Age: 70
Setting detail: HOSPITAL OUTPATIENT SURGERY
Discharge: HOME | End: 2025-04-30
Attending: SURGERY | Admitting: SURGERY
Payer: MEDICARE

## 2025-04-30 ENCOUNTER — ANESTHESIA (OUTPATIENT)
Dept: OPERATING ROOM | Facility: HOSPITAL | Age: 70
Setting detail: HOSPITAL OUTPATIENT SURGERY
End: 2025-04-30
Payer: MEDICARE

## 2025-04-30 VITALS
RESPIRATION RATE: 16 BRPM | BODY MASS INDEX: 27.98 KG/M2 | SYSTOLIC BLOOD PRESSURE: 151 MMHG | WEIGHT: 195 LBS | HEART RATE: 74 BPM | OXYGEN SATURATION: 94 % | DIASTOLIC BLOOD PRESSURE: 76 MMHG | TEMPERATURE: 98.6 F

## 2025-04-30 DIAGNOSIS — Z98.890 POST-OPERATIVE STATE: Primary | ICD-10-CM

## 2025-04-30 PROCEDURE — 71000001 HC PACU PHASE 1 INITIAL 30MIN: Performed by: SURGERY

## 2025-04-30 PROCEDURE — 63700000 HC SELF-ADMINISTRABLE DRUG: Performed by: SURGERY

## 2025-04-30 PROCEDURE — 71000002 HC PACU PHASE 2 INITIAL 30MIN: Performed by: SURGERY

## 2025-04-30 PROCEDURE — 71000012 HC PACU PHASE 2 EA ADDL MIN: Performed by: SURGERY

## 2025-04-30 PROCEDURE — 63600000 HC DRUGS/DETAIL CODE: Mod: JZ | Performed by: NURSE ANESTHETIST, CERTIFIED REGISTERED

## 2025-04-30 PROCEDURE — 63600000 HC DRUGS/DETAIL CODE: Mod: JW | Performed by: SURGERY

## 2025-04-30 PROCEDURE — 25000000 HC PHARMACY GENERAL: Performed by: NURSE ANESTHETIST, CERTIFIED REGISTERED

## 2025-04-30 PROCEDURE — 37000001 HC ANESTHESIA GENERAL: Performed by: SURGERY

## 2025-04-30 PROCEDURE — 25000000 HC PHARMACY GENERAL: Performed by: SURGERY

## 2025-04-30 PROCEDURE — 08T53ZZ RESECTION OF LEFT VITREOUS, PERCUTANEOUS APPROACH: ICD-10-PCS | Performed by: SURGERY

## 2025-04-30 PROCEDURE — 71000011 HC PACU PHASE 1 EA ADDL MIN: Performed by: SURGERY

## 2025-04-30 PROCEDURE — 08QF3ZZ REPAIR LEFT RETINA, PERCUTANEOUS APPROACH: ICD-10-PCS | Performed by: SURGERY

## 2025-04-30 PROCEDURE — 63600000 HC DRUGS/DETAIL CODE: Performed by: NURSE ANESTHETIST, CERTIFIED REGISTERED

## 2025-04-30 PROCEDURE — 36000005 HC OR LEVEL 5 INITIAL 30MIN: Performed by: SURGERY

## 2025-04-30 PROCEDURE — 27200000 HC STERILE SUPPLY: Performed by: SURGERY

## 2025-04-30 PROCEDURE — 36000015 HC OR LEVEL 5 EA ADDL MIN: Performed by: SURGERY

## 2025-04-30 RX ORDER — DEXAMETHASONE SODIUM PHOSPHATE 4 MG/ML
INJECTION, SOLUTION INTRA-ARTICULAR; INTRALESIONAL; INTRAMUSCULAR; INTRAVENOUS; SOFT TISSUE AS NEEDED
Status: DISCONTINUED | OUTPATIENT
Start: 2025-04-30 | End: 2025-04-30 | Stop reason: SURG

## 2025-04-30 RX ORDER — DEXAMETHASONE SODIUM PHOSPHATE 4 MG/ML
INJECTION, SOLUTION INTRA-ARTICULAR; INTRALESIONAL; INTRAMUSCULAR; INTRAVENOUS; SOFT TISSUE
Status: DISCONTINUED | OUTPATIENT
Start: 2025-04-30 | End: 2025-04-30 | Stop reason: HOSPADM

## 2025-04-30 RX ORDER — PROPOFOL 10 MG/ML
INJECTION, EMULSION INTRAVENOUS AS NEEDED
Status: DISCONTINUED | OUTPATIENT
Start: 2025-04-30 | End: 2025-04-30 | Stop reason: SURG

## 2025-04-30 RX ORDER — FENTANYL CITRATE 50 UG/ML
50 INJECTION, SOLUTION INTRAMUSCULAR; INTRAVENOUS EVERY 5 MIN PRN
Status: DISCONTINUED | OUTPATIENT
Start: 2025-04-30 | End: 2025-04-30 | Stop reason: HOSPADM

## 2025-04-30 RX ORDER — DEXTROSE 40 %
15-30 GEL (GRAM) ORAL AS NEEDED
Status: DISCONTINUED | OUTPATIENT
Start: 2025-04-30 | End: 2025-04-30 | Stop reason: HOSPADM

## 2025-04-30 RX ORDER — OXYCODONE AND ACETAMINOPHEN 5; 325 MG/1; MG/1
1 TABLET ORAL EVERY 4 HOURS PRN
Qty: 30 TABLET | Refills: 0 | Status: SHIPPED | OUTPATIENT
Start: 2025-04-30 | End: 2025-05-05

## 2025-04-30 RX ORDER — CEFAZOLIN SODIUM 100 MG/ML
INJECTION, SOLUTION INTRAVENOUS
Status: DISCONTINUED | OUTPATIENT
Start: 2025-04-30 | End: 2025-04-30 | Stop reason: HOSPADM

## 2025-04-30 RX ORDER — MOXIFLOXACIN 5 MG/ML
1 SOLUTION/ DROPS OPHTHALMIC
Status: COMPLETED | OUTPATIENT
Start: 2025-04-30 | End: 2025-04-30

## 2025-04-30 RX ORDER — MIDAZOLAM HYDROCHLORIDE 2 MG/2ML
INJECTION, SOLUTION INTRAMUSCULAR; INTRAVENOUS AS NEEDED
Status: DISCONTINUED | OUTPATIENT
Start: 2025-04-30 | End: 2025-04-30 | Stop reason: SURG

## 2025-04-30 RX ORDER — IBUPROFEN 200 MG
16-32 TABLET ORAL AS NEEDED
Status: DISCONTINUED | OUTPATIENT
Start: 2025-04-30 | End: 2025-04-30 | Stop reason: HOSPADM

## 2025-04-30 RX ORDER — ACETAMINOPHEN 325 MG/1
650 TABLET ORAL ONCE AS NEEDED
Status: DISCONTINUED | OUTPATIENT
Start: 2025-04-30 | End: 2025-04-30 | Stop reason: HOSPADM

## 2025-04-30 RX ORDER — TRIAMCINOLONE ACETONIDE 40 MG/ML
INJECTION, SUSPENSION INTRA-ARTICULAR; INTRAMUSCULAR
Status: DISCONTINUED | OUTPATIENT
Start: 2025-04-30 | End: 2025-04-30 | Stop reason: HOSPADM

## 2025-04-30 RX ORDER — SODIUM CHLORIDE, SODIUM LACTATE, POTASSIUM CHLORIDE, CALCIUM CHLORIDE 600; 310; 30; 20 MG/100ML; MG/100ML; MG/100ML; MG/100ML
INJECTION, SOLUTION INTRAVENOUS CONTINUOUS
Status: DISCONTINUED | OUTPATIENT
Start: 2025-04-30 | End: 2025-04-30 | Stop reason: HOSPADM

## 2025-04-30 RX ORDER — POVIDONE-IODINE 5 %
SOLUTION, NON-ORAL OPHTHALMIC (EYE)
Status: DISCONTINUED | OUTPATIENT
Start: 2025-04-30 | End: 2025-04-30 | Stop reason: HOSPADM

## 2025-04-30 RX ORDER — PROPARACAINE HYDROCHLORIDE 5 MG/ML
1 SOLUTION/ DROPS OPHTHALMIC
Status: COMPLETED | OUTPATIENT
Start: 2025-04-30 | End: 2025-04-30

## 2025-04-30 RX ORDER — OXYCODONE HYDROCHLORIDE 5 MG/1
5 TABLET ORAL ONCE AS NEEDED
Status: DISCONTINUED | OUTPATIENT
Start: 2025-04-30 | End: 2025-04-30 | Stop reason: HOSPADM

## 2025-04-30 RX ORDER — DEXTROSE 50 % IN WATER (D50W) INTRAVENOUS SYRINGE
25 AS NEEDED
Status: DISCONTINUED | OUTPATIENT
Start: 2025-04-30 | End: 2025-04-30 | Stop reason: HOSPADM

## 2025-04-30 RX ORDER — LIDOCAINE HCL/PF 100 MG/5ML
SYRINGE (ML) INTRAVENOUS AS NEEDED
Status: DISCONTINUED | OUTPATIENT
Start: 2025-04-30 | End: 2025-04-30 | Stop reason: SURG

## 2025-04-30 RX ORDER — DEXMEDETOMIDINE HYDROCHLORIDE 4 UG/ML
INJECTION, SOLUTION INTRAVENOUS AS NEEDED
Status: DISCONTINUED | OUTPATIENT
Start: 2025-04-30 | End: 2025-04-30 | Stop reason: SURG

## 2025-04-30 RX ORDER — CYCLOPENTOLAT/TROPIC/PHENYLEPH 1%-1%-2.5%
1 DROPS (EA) OPHTHALMIC (EYE)
Status: COMPLETED | OUTPATIENT
Start: 2025-04-30 | End: 2025-04-30

## 2025-04-30 RX ORDER — ONDANSETRON HYDROCHLORIDE 2 MG/ML
4 INJECTION, SOLUTION INTRAVENOUS
Status: DISCONTINUED | OUTPATIENT
Start: 2025-04-30 | End: 2025-04-30 | Stop reason: HOSPADM

## 2025-04-30 RX ORDER — EPINEPHRINE 1 MG/ML
INJECTION, SOLUTION INTRAMUSCULAR; SUBCUTANEOUS
Status: DISCONTINUED | OUTPATIENT
Start: 2025-04-30 | End: 2025-04-30 | Stop reason: HOSPADM

## 2025-04-30 RX ORDER — HYDROMORPHONE HYDROCHLORIDE 1 MG/ML
0.5 INJECTION, SOLUTION INTRAMUSCULAR; INTRAVENOUS; SUBCUTANEOUS
Status: DISCONTINUED | OUTPATIENT
Start: 2025-04-30 | End: 2025-04-30 | Stop reason: HOSPADM

## 2025-04-30 RX ORDER — NEOMYCIN SULFATE, POLYMYXIN B SULFATE, AND DEXAMETHASONE 3.5; 10000; 1 MG/G; [USP'U]/G; MG/G
OINTMENT OPHTHALMIC
Status: DISCONTINUED | OUTPATIENT
Start: 2025-04-30 | End: 2025-04-30 | Stop reason: HOSPADM

## 2025-04-30 RX ORDER — PREDNISOLONE ACETATE 10 MG/ML
1 SUSPENSION/ DROPS OPHTHALMIC
Status: COMPLETED | OUTPATIENT
Start: 2025-04-30 | End: 2025-04-30

## 2025-04-30 RX ORDER — FENTANYL CITRATE 50 UG/ML
INJECTION, SOLUTION INTRAMUSCULAR; INTRAVENOUS AS NEEDED
Status: DISCONTINUED | OUTPATIENT
Start: 2025-04-30 | End: 2025-04-30 | Stop reason: SURG

## 2025-04-30 RX ORDER — CYCLOPENTOLATE HYDROCHLORIDE 10 MG/ML
SOLUTION/ DROPS OPHTHALMIC
Status: DISCONTINUED | OUTPATIENT
Start: 2025-04-30 | End: 2025-04-30 | Stop reason: HOSPADM

## 2025-04-30 RX ORDER — ONDANSETRON HYDROCHLORIDE 2 MG/ML
INJECTION, SOLUTION INTRAVENOUS AS NEEDED
Status: DISCONTINUED | OUTPATIENT
Start: 2025-04-30 | End: 2025-04-30 | Stop reason: SURG

## 2025-04-30 RX ORDER — ROCURONIUM BROMIDE 10 MG/ML
INJECTION, SOLUTION INTRAVENOUS AS NEEDED
Status: DISCONTINUED | OUTPATIENT
Start: 2025-04-30 | End: 2025-04-30 | Stop reason: SURG

## 2025-04-30 RX ADMIN — ONDANSETRON HYDROCHLORIDE 4 MG: 2 INJECTION, SOLUTION INTRAMUSCULAR; INTRAVENOUS at 12:29

## 2025-04-30 RX ADMIN — PROPARACAINE HYDROCHLORIDE 1 DROP: 5 SOLUTION/ DROPS OPHTHALMIC at 09:17

## 2025-04-30 RX ADMIN — PREDNISOLONE ACETATE 1 DROP: 10 SUSPENSION/ DROPS OPHTHALMIC at 09:04

## 2025-04-30 RX ADMIN — PREDNISOLONE ACETATE 1 DROP: 10 SUSPENSION/ DROPS OPHTHALMIC at 09:18

## 2025-04-30 RX ADMIN — PROPARACAINE HYDROCHLORIDE 1 DROP: 5 SOLUTION/ DROPS OPHTHALMIC at 09:28

## 2025-04-30 RX ADMIN — ROCURONIUM BROMIDE 30 MG: 10 INJECTION, SOLUTION INTRAVENOUS at 11:00

## 2025-04-30 RX ADMIN — DEXAMETHASONE SODIUM PHOSPHATE 4 MG: 4 INJECTION, SOLUTION INTRAMUSCULAR; INTRAVENOUS at 11:11

## 2025-04-30 RX ADMIN — MIDAZOLAM HYDROCHLORIDE 2 MG: 1 INJECTION, SOLUTION INTRAMUSCULAR; INTRAVENOUS at 10:40

## 2025-04-30 RX ADMIN — MOXIFLOXACIN 1 DROP: 5 SOLUTION/ DROPS OPHTHALMIC at 09:04

## 2025-04-30 RX ADMIN — SODIUM CHLORIDE, POTASSIUM CHLORIDE, SODIUM LACTATE AND CALCIUM CHLORIDE: 600; 310; 30; 20 INJECTION, SOLUTION INTRAVENOUS at 09:18

## 2025-04-30 RX ADMIN — PREDNISOLONE ACETATE 1 DROP: 10 SUSPENSION/ DROPS OPHTHALMIC at 09:28

## 2025-04-30 RX ADMIN — Medication 1 DROP: at 09:28

## 2025-04-30 RX ADMIN — DEXMEDETOMIDINE HYDROCHLORIDE IN 0.9% SODIUM CHLORIDE 8 MCG: 4 INJECTION INTRAVENOUS at 13:00

## 2025-04-30 RX ADMIN — MOXIFLOXACIN 1 DROP: 5 SOLUTION/ DROPS OPHTHALMIC at 09:28

## 2025-04-30 RX ADMIN — LIDOCAINE HYDROCHLORIDE 100 MG: 20 INJECTION, SOLUTION INTRAVENOUS at 10:45

## 2025-04-30 RX ADMIN — SUGAMMADEX 200 MG: 100 INJECTION, SOLUTION INTRAVENOUS at 12:59

## 2025-04-30 RX ADMIN — Medication 1 DROP: at 09:04

## 2025-04-30 RX ADMIN — PROPOFOL 50 MG: 10 INJECTION, EMULSION INTRAVENOUS at 12:57

## 2025-04-30 RX ADMIN — Medication 1 DROP: at 09:18

## 2025-04-30 RX ADMIN — PROPARACAINE HYDROCHLORIDE 1 DROP: 5 SOLUTION/ DROPS OPHTHALMIC at 09:04

## 2025-04-30 RX ADMIN — MOXIFLOXACIN 1 DROP: 5 SOLUTION/ DROPS OPHTHALMIC at 09:18

## 2025-04-30 RX ADMIN — ROCURONIUM BROMIDE 50 MG: 10 INJECTION, SOLUTION INTRAVENOUS at 10:45

## 2025-04-30 RX ADMIN — ROCURONIUM BROMIDE 10 MG: 10 INJECTION, SOLUTION INTRAVENOUS at 12:03

## 2025-04-30 RX ADMIN — PROPOFOL 200 MG: 10 INJECTION, EMULSION INTRAVENOUS at 10:45

## 2025-04-30 RX ADMIN — FENTANYL CITRATE 100 MCG: 50 INJECTION, SOLUTION INTRAMUSCULAR; INTRAVENOUS at 10:45

## 2025-04-30 RX ADMIN — ROCURONIUM BROMIDE 10 MG: 10 INJECTION, SOLUTION INTRAVENOUS at 11:42

## 2025-04-30 NOTE — OP NOTE
Preoperative diagnosis:   Posterior vitreous detachment   Retinal tears - multiple   Vitreous debris  left eye  Postoperative diagnosis:   Same  left eye  Procedure performed:   Pars plana vitrectomy   Laser retinopexy - 360 degrees   left eye   Attending surgeon:   Bryan Ji MD  Assistant(s):   N/A  Anesthesia:   General endotracheal   Specimen(s):   None  Implant Identifier(s):   N/A     Description of procedure:  After a thorough discussion of the risk, benefits, and alternatives to the proposed operation, informed consent was obtained and the operative site was marked. The patient was brought to the operating room and placed in a supine position. Appropriate monitors were applied and anesthetic was administered. Preoperative timeouts were performed in full compliance with hospital policy.     The nonoperative eye was taped closed and shielded. The patient was prepped with ophthalmic betadine solution and draped in the typical sterile fashion. A lid speculum was inserted over the operative eye.    Pars plana vitrectomy was initiated on a 25 gauge Inder platform. A trochar-cannula was used to establish a port through the pars plana in the inferotemporal quadrant. After the cannula was visualized in the vitreous cavity, an infusion was initiated with balanced salt solution plus (BSS+).     Ports were similarly placed through the pars plana in the superior temporal and nasal quadrants. The light pipe and vitrector were inserted and the posterior segment was examined under wide field visualization using a noncontact lens system.     Due to difficulty with identification of the posterior hyaloid membrane, triamcinolone (diluted 1:1 with BSS) was injected to stain the vitreous. The vitrector was used to engage and peel the posterior hyaloid membrane over the optic nerve with an en-bloc technique. Once a posterior vitreous detachment (PVD) was induced, the posterior cortical vitreous was further dissected using the  vitrector. Vitreous dissection was extended out to the periphery as far as safely possible. Complete peripheral retinal exam 360 degrees was completed using scleral depression.     Multiple retinal tears were identified at 1, 2, 3, 4, 5:30, 10:30 o'clock, They were surrounded with previous laser. Additional laser was applied to reinforce the repair and the laser was carried 360 degrees given the extent of retinal tears. See OR laser log for treatment parameters.     The cannulas were sequentially removed as the wounds were inspected and closed with 6-0 plain gut for any leaks. The overlying conjunctiva was inspected and re-approximated as needed with 6-0 plain gut suture.     The eye was adjusted to appropriate tactile pressure. Subconjunctival injections of cefazolin (50mg/mL) and dexamethasone (4mg/mL) were performed. The lid speculum was removed. Ophthalmic cyclopentolate solution and antibiotic ointment was applied to the eye. The eye was patched closed and a rigid shield was secured over the orbit.     The procedure was tolerated well, without complication. The patient was awakened from anesthetic and was taken to PACU in stable condition. Postoperative instructions were reviewed with the patient and/or caretaker prior to discharge.     Dr. Ji was present for the critical portions of the procedure and remained immediately available for the entire duration of the case.

## 2025-04-30 NOTE — OR SURGEON
Pre-Procedure patient identification:  I am the primary operating surgeon/proceduralist and I have reviewed the applicable pathology reports and radiology studies for this procedure. I have identified the patient and confirmed laterality is left on 04/30/25 at 10:15 AM Bryan Ji MD  Phone Number: [phone]

## 2025-04-30 NOTE — DISCHARGE INSTRUCTIONS
"AFTER SURGERY INSTRUCTIONS:    Please keep the patch and shield in place overnight. We will remove the patch and shield tomorrow in the clinic.  You do not need to use any eye drops. We will explain and start the drop schedule in the clinic the day after surgery.   The eye is pressure patched closed under the bandages.   It is normal to see \"circular objects\" in the eye - it is gas or oil that was used during the surgery.   Do not shower until you are seen in the clinic the day after surgery.   Use tylenol as directed on the bottle for pain unless you have been told not to use tylenol by your primary care physician.   No vigorous activity or swimming until you are cleared by your surgeon.     Call the clinic with any questions or concerns at [phone] and tell the  you just had surgery.     Please return to the clinic, [facility], tomorrow at 8:15 AM   [address] [place] [address]   "

## 2025-06-18 ENCOUNTER — TELEPHONE (OUTPATIENT)
Dept: SURGERY | Facility: CLINIC | Age: 70
End: 2025-06-18
Payer: MEDICARE

## 2025-06-18 NOTE — TELEPHONE ENCOUNTER
Reached out to PCP office, received referral and not sure what they would like patient to be seen for.

## 2025-06-30 ENCOUNTER — OFFICE VISIT (OUTPATIENT)
Dept: SURGERY | Facility: CLINIC | Age: 70
End: 2025-06-30
Payer: MEDICARE

## 2025-06-30 VITALS
HEART RATE: 73 BPM | HEIGHT: 70 IN | OXYGEN SATURATION: 98 % | BODY MASS INDEX: 27.92 KG/M2 | TEMPERATURE: 98 F | SYSTOLIC BLOOD PRESSURE: 118 MMHG | DIASTOLIC BLOOD PRESSURE: 84 MMHG | WEIGHT: 195 LBS

## 2025-06-30 DIAGNOSIS — K40.00 BILATERAL IRREDUCIBLE INGUINAL HERNIAS: ICD-10-CM

## 2025-06-30 PROCEDURE — G8752 SYS BP LESS 140: HCPCS | Performed by: SURGERY

## 2025-06-30 PROCEDURE — 99213 OFFICE O/P EST LOW 20 MIN: CPT | Performed by: SURGERY

## 2025-06-30 PROCEDURE — G8754 DIAS BP LESS 90: HCPCS | Performed by: SURGERY
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
## 2025-07-07 ENCOUNTER — HOSPITAL ENCOUNTER (OUTPATIENT)
Dept: RADIOLOGY | Age: 70
Discharge: HOME | End: 2025-07-07
Attending: SURGERY
Payer: MEDICARE

## 2025-07-07 DIAGNOSIS — K40.00 BILATERAL INGUINAL HERNIA WITH OBSTRUCTION BUT NO GANGRENE: ICD-10-CM

## 2025-07-07 PROCEDURE — 63600105 HC IODINE BASED CONTRAST: Mod: JW | Performed by: SURGERY

## 2025-07-07 PROCEDURE — 74177 CT ABD & PELVIS W/CONTRAST: CPT

## 2025-07-07 PROCEDURE — 25500000 HC DRUGS/INCIDENT RAD: Performed by: SURGERY

## 2025-07-07 RX ORDER — IOPAMIDOL 755 MG/ML
75 INJECTION, SOLUTION INTRAVASCULAR
Status: COMPLETED | OUTPATIENT
Start: 2025-07-07 | End: 2025-07-07

## 2025-07-07 RX ADMIN — BARIUM SULFATE 900 ML: 20 SUSPENSION ORAL at 13:32

## 2025-07-07 RX ADMIN — IOPAMIDOL 75 ML: 755 INJECTION, SOLUTION INTRAVENOUS at 14:16

## 2025-07-07 NOTE — PROGRESS NOTES
"Julian Vazquez   [id number]    Your doctor has referred you for a CT ABDOMEN PELVIS W IV CONTRAST that requires the injection of an iodinated contrast material into your bloodstream. This iodinated contrast material, sometimes referred to as \"x-ray dye\" allows for better interpretation of the x-ray films or CT images and results in a more accurate interpretation of the examination.     Without the use of iodinated contrast (x-ray dye), the examination may be less informative and result in a suboptimal examination, and possibly a delay in diagnosis and, if needed, treatment.     The iodinated contrast material is given through a small needle or catheter placed into a vein, usually on the inside of the elbow, on the back of hand, or in a vein in the foot or lower leg.    The most common, though still rare, potential reaction to an intravenous contrast injection is an allergic-like reaction. Most allergic-like reactions are mild, though a small subset of people can have more severe reactions. Mild reactions include mild / scattered hives, itching, scratchy throat, sneezing and nasal congestion. More severe reactions include facial swelling, severe difficulty breathing, wheezing and anaphylactic shock. Those with a prior history allergic-like reaction to the same class of contrast media (such as CT contrast or MRI contrast) are at the highest risk for an allergic reaction.     If you believe you had an allergic reaction to contrast in the past, please let our staff know. We can determine if this increases your risk for a future reaction and provide steps to decrease the risk. This may delay your examination, but it decreases the risk of having a new and possibly more severe reaction to the contrast injection.    People with a history of prior allergic reactions to other substances (such as unrelated medications and food) and patients with a history of asthma have slightly increased risk for an allergic reaction " from contrast material when compared with the general population, but do not require to be pretreated prior to a contrast injection.    You should notify the physician, nurse or technologist if you have ever had any of these conditions or if you have any questions.

## 2025-08-07 ENCOUNTER — PRE-ADMISSION TESTING (OUTPATIENT)
Dept: PREADMISSION TESTING | Facility: HOSPITAL | Age: 70
End: 2025-08-07
Attending: SURGERY
Payer: MEDICARE

## 2025-08-07 ENCOUNTER — TELEPHONE (OUTPATIENT)
Dept: SURGERY | Facility: CLINIC | Age: 70
End: 2025-08-07
Payer: MEDICARE

## 2025-08-07 ENCOUNTER — HOSPITAL ENCOUNTER (OUTPATIENT)
Dept: CARDIOLOGY | Facility: HOSPITAL | Age: 70
Discharge: HOME | End: 2025-08-07
Attending: SURGERY
Payer: MEDICARE

## 2025-08-07 ENCOUNTER — APPOINTMENT (OUTPATIENT)
Dept: LAB | Facility: HOSPITAL | Age: 70
End: 2025-08-07
Attending: SURGERY
Payer: MEDICARE

## 2025-08-07 VITALS
SYSTOLIC BLOOD PRESSURE: 149 MMHG | BODY MASS INDEX: 28 KG/M2 | TEMPERATURE: 98.8 F | DIASTOLIC BLOOD PRESSURE: 67 MMHG | OXYGEN SATURATION: 96 % | HEART RATE: 60 BPM | HEIGHT: 70 IN | RESPIRATION RATE: 16 BRPM | WEIGHT: 195.6 LBS

## 2025-08-07 DIAGNOSIS — Z01.818 PREOP EXAMINATION: Primary | ICD-10-CM

## 2025-08-07 DIAGNOSIS — K40.20 NON-RECURRENT BILATERAL INGUINAL HERNIA WITHOUT OBSTRUCTION OR GANGRENE: ICD-10-CM

## 2025-08-07 LAB
ANION GAP SERPL CALC-SCNC: 5 MEQ/L (ref 3–15)
ATRIAL RATE: 57
BASOPHILS # BLD: 0.04 K/UL (ref 0.01–0.1)
BASOPHILS NFR BLD: 0.6 %
BUN SERPL-MCNC: 12 MG/DL (ref 7–25)
CALCIUM SERPL-MCNC: 9.4 MG/DL (ref 8.6–10.3)
CHLORIDE SERPL-SCNC: 106 MEQ/L (ref 98–107)
CO2 SERPL-SCNC: 28 MEQ/L (ref 21–31)
CREAT SERPL-MCNC: 0.9 MG/DL (ref 0.7–1.3)
DIFFERENTIAL METHOD BLD: ABNORMAL
EGFRCR SERPLBLD CKD-EPI 2021: >60 ML/MIN/1.73M*2
EOSINOPHIL # BLD: 0.08 K/UL (ref 0.04–0.54)
EOSINOPHIL NFR BLD: 1.2 %
ERYTHROCYTE [DISTWIDTH] IN BLOOD BY AUTOMATED COUNT: 12.6 % (ref 11.6–14.4)
GLUCOSE SERPL-MCNC: 109 MG/DL (ref 70–99)
HCT VFR BLD AUTO: 40.7 % (ref 40.1–51)
HGB BLD-MCNC: 13.3 G/DL (ref 13.7–17.5)
IMM GRANULOCYTES # BLD AUTO: 0.02 K/UL (ref 0–0.08)
IMM GRANULOCYTES NFR BLD AUTO: 0.3 %
LYMPHOCYTES # BLD: 1.64 K/UL (ref 1.2–3.5)
LYMPHOCYTES NFR BLD: 25.1 %
MCH RBC QN AUTO: 31.9 PG (ref 28–33.2)
MCHC RBC AUTO-ENTMCNC: 32.7 G/DL (ref 32.2–36.5)
MCV RBC AUTO: 97.6 FL (ref 83–98)
MONOCYTES # BLD: 0.52 K/UL (ref 0.3–1)
MONOCYTES NFR BLD: 8 %
NEUTROPHILS # BLD: 4.24 K/UL (ref 1.7–7)
NEUTS SEG NFR BLD: 64.8 %
NRBC BLD-RTO: 0 %
P AXIS: 22
PLATELET # BLD AUTO: 202 K/UL (ref 150–350)
PMV BLD AUTO: 10.5 FL (ref 9.4–12.4)
POTASSIUM SERPL-SCNC: 3.9 MEQ/L (ref 3.5–5.1)
PR INTERVAL: 208
QRS DURATION: 96
QT INTERVAL: 424
QTC CALCULATION(BAZETT): 412
R AXIS: -65
RBC # BLD AUTO: 4.17 M/UL (ref 4.5–5.8)
SODIUM SERPL-SCNC: 139 MEQ/L (ref 136–145)
T WAVE AXIS: 78
VENTRICULAR RATE: 57
WBC # BLD AUTO: 6.54 K/UL (ref 3.8–10.5)

## 2025-08-07 PROCEDURE — 85025 COMPLETE CBC W/AUTO DIFF WBC: CPT

## 2025-08-07 PROCEDURE — 36415 COLL VENOUS BLD VENIPUNCTURE: CPT

## 2025-08-07 PROCEDURE — 80048 BASIC METABOLIC PNL TOTAL CA: CPT

## 2025-08-07 ASSESSMENT — PAIN SCALES - GENERAL: PAINLEVEL_OUTOF10: 0-NO PAIN

## 2025-08-07 NOTE — PRE-PROCEDURE INSTRUCTIONS
[address] [address]    1. You will be called between 1pm-5pm one business day before your procedure to tell you where and when to report. If you do not receive a phone call by 6pm, please call the Operating Room at [phone].    2. Please report to Surgery Registration on the day of your procedure. You can park in the [facility] garage, enter the front doors of the new [facility], and take the [facility] elevators to the second floor. Follow signs to Surgery Registration.         3. Please follow the following fasting guidelines:     No solid food EIGHT HOURS prior to arrival time on day of surgery.  6 ounces of clear liquids, meaning water or PLAIN black coffee WITHOUT any milk, cream, sugar, or sweetener are permitted up to TWO HOURS prior to arrival at the hospital.    4. Please take ONLY the following medications with a sip of water on the morning of your procedure: Amlodipine    -> Hold vitamins, supplements, NSAIDs (Motrin, Advil, Aleve, ibuprofen) 1 week prior to your procedure.  You may take Tylenol if needed.      5. Other Instructions: You may brush your teeth the morning of the procedure. Rinse and spit, do not swallow.  Bring a list of your medications with dosages.  Use surgical wash as directed.     [facility]  Patient Education Preoperative Showers    Good hygiene, such as frequent handwashing and daily skin cleansing, promotes good health. Daily skin cleansing helps remove germs that may cause infections. The following instructions should be followed to help reduce germs on your skin prior to your surgical procedure.     Bactoshield/Hibiclens CHG 4% is an antiseptic soap. The active ingredient is chlorhexidine gluconate. Do not use this product if you are allergic to chlorhexidine gluconate.  The NIGHT before and the MORNING of your procedure, shower or bathe with Bactoshield. This product should replace your regular soap used for cleansing most of your body  surfaces. Bactoshield should not be used on your head or face; keep out of your eyes, ears and mouth.  If you plan to wash your hair, do so with your regular shampoo. Then, rinse the hair and your body thoroughly to remove any shampoo residue.  Wash your face with regular soap and water only.  Wash your genital area with soap and water only.  Thoroughly rinse your body with warm water from the neck down.  Apply the minimum amount of Bactoshield to cover the skin. Use this product as you would any liquid soap. Leave this on for 2 minutes. NOTE- Bactoshield DOES NOT LATHER like normal soap.   Wash the skin gently and rinse thoroughly with warm water. You do not need to scrub the skin to remove germs.  Do not use regular soap after you have applied and rinsed the Bactoshield.  Change into clean clothes after each shower.   Do not apply any lotions, powders, or perfumes to the body areas that have been cleansed with Bactoshield.  No use of hair removal products or shaving at or near the surgical site 48 hours before your procedure. (72 hours for cardiac patients.)  For those having perineal surgeries (i.e.: vaginal, rectal or cystoscopy) - please use Dial soap.    6. If you develop a cold, cough, fever, rash, or other symptom prior to the day of the procedure, please report it to your physician immediately.    7. If you need to cancel the procedure for any reason, please contact your physician.    8. Make arrangements to have safe transportation home accompanied by a responsible adult. If you have not arranged safe transportation home, your surgery will be cancelled. Safe transportation may include private vehicle, ride-share service, taxi and public transportation when accompanied by a responsible adult who will assist you home. A responsible adult is someone known to you and does not include the taxi, ride-share or public transit drive transporting you.    9.  If it is medically necessary for you to have a longer  stay, you will be informed as soon as the decision is made.    10. Only bring essential items to the hospital.  Do not wear or bring anything of value to the hospital including jewelry of any kind, money, or wallet. Do not wear make-up or contact lenses.  DO NOT BRING MEDICATIONS FROM HOME unless instructed to do so. DO bring your hearing aids, glasses, and a case    11. No lotion, creams, powders, or oils on skin the morning of procedure     12. Dress in comfortable clothes.    13.  If instructed, please bring a copy of your Advanced Directive (Living Will/Durable Power of ) on the day of your procedure.     14. Ensuring your safety at all times is a very important part of our [facility] Culture of Safety. After having surgery and sedation, you are at risk for falling and balance issues. Although you may feel awake, the effects of the medication can last up to 24 hours after anesthesia. If you need to use the bathroom during your recovery period, nursing staff will escort you there and stay with you to ensure your safety.    15. Refrain from drinking alcohol and smoking cigarettes for 24 hours prior to surgery.        Above instructions reviewed with patient and patient acknowledges understanding.    Form explained by: XIAO Tesfaye
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
## 2025-08-07 NOTE — TELEPHONE ENCOUNTER
PAT called to inform us patient will need a cardiac clearance prior to surgery on 8/21. They scheduled him tomorrow at 9am. Surgery schedulers are notified and aware to look out for cards clearance/recommendations
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
## 2025-08-08 ENCOUNTER — HOSPITAL ENCOUNTER (OUTPATIENT)
Dept: CARDIOLOGY | Facility: CLINIC | Age: 70
Discharge: HOME | End: 2025-08-08
Attending: INTERNAL MEDICINE
Payer: MEDICARE

## 2025-08-08 ENCOUNTER — OFFICE VISIT (OUTPATIENT)
Dept: CARDIOLOGY | Facility: CLINIC | Age: 70
End: 2025-08-08
Payer: MEDICARE

## 2025-08-08 VITALS
HEIGHT: 70 IN | BODY MASS INDEX: 27.77 KG/M2 | SYSTOLIC BLOOD PRESSURE: 140 MMHG | HEART RATE: 58 BPM | WEIGHT: 194 LBS | DIASTOLIC BLOOD PRESSURE: 78 MMHG | OXYGEN SATURATION: 97 %

## 2025-08-08 VITALS
DIASTOLIC BLOOD PRESSURE: 78 MMHG | SYSTOLIC BLOOD PRESSURE: 140 MMHG | WEIGHT: 194 LBS | BODY MASS INDEX: 27.77 KG/M2 | HEIGHT: 70 IN

## 2025-08-08 DIAGNOSIS — I25.10 CORONARY ARTERY CALCIFICATION: ICD-10-CM

## 2025-08-08 DIAGNOSIS — R94.31 ABNORMAL ECG: ICD-10-CM

## 2025-08-08 DIAGNOSIS — I35.0 NONRHEUMATIC AORTIC VALVE STENOSIS: ICD-10-CM

## 2025-08-08 DIAGNOSIS — E78.5 DYSLIPIDEMIA: ICD-10-CM

## 2025-08-08 DIAGNOSIS — I10 PRIMARY HYPERTENSION: ICD-10-CM

## 2025-08-08 DIAGNOSIS — Z01.810 PREOP CARDIOVASCULAR EXAM: ICD-10-CM

## 2025-08-08 DIAGNOSIS — R01.1 MURMUR: ICD-10-CM

## 2025-08-08 DIAGNOSIS — Z01.818 PRE-OP EXAMINATION: Primary | ICD-10-CM

## 2025-08-08 LAB
AORTIC ROOT ANNULUS: 3.8 CM
AORTIC VALVE MEAN VELOCITY: 2.11 M/S
AORTIC VALVE VELOCITY TIME INTEGRAL: 80.2 CM
ASCENDING AORTA: 3.5 CM
ATRIAL RATE: 58
AV MEAN GRADIENT: 20 MMHG
AV PEAK GRADIENT: 38 MMHG
AV PEAK VELOCITY-S: 3.09 M/S
AV REG PEAK VEL: 4.01 M/S
AV REGURGITATION PRESSURE HALF TIME: 608 MS
AV VALVE AREA INDEX: 0.86
AV VALVE AREA: 1.42 CM2
AV VELOCITY RATIO: 0.43
AVA (VTI): 1.8 CM2
BSA FOR ECHO PROCEDURE: 2.08 M2
CUSP SEPARATION: 1.4 CM
DOP CALC LVOT STROKE VOLUME: 144.1 CM3
E WAVE DECELERATION TIME: 303 MS
E/A RATIO: 1.1
E/E' RATIO: 12.1
E/LAT E' RATIO: 8.5
EDV (BP): 163 CM3
EF (A4C): 61.7 %
EF A2C: 64.1 %
EJECTION FRACTION: 62 %
EST RIGHT VENT SYSTOLIC PRESSURE BY TRICUSPID REGURGITATION JET: 31 MMHG
ESV (BP): 61.9 CM3
FRACTIONAL SHORTENING: 34 %
INTERVENTRICULAR SEPTUM: 1.1 CM
LA ESV (BP): 39.2 CM3
LA ESV INDEX (A2C): 19.04 CM3/M2
LA ESV INDEX (BP): 18.85 CM3/M2
LA/AORTA RATIO: 0.92
LAAS-AP2: 14.5 CM2
LAAS-AP4: 14 CM2
LAD 2D: 3.5 CM
LAL MED-LAT (A4C): 3.98 CM
LAV-S: 39.6 CM3
LEFT ATRIAL LENGTH SUPERIOR-INFERIOR (APICAL 2-CHAMBER VIEW): 4.02 CM
LEFT ATRIUM VOLUME INDEX: 18.56 CM3/M2
LEFT ATRIUM VOLUME: 38.6 CM3
LEFT INTERNAL DIMENSION IN SYSTOLE: 3.3 CM (ref 3.1–4.69)
LEFT VENTRICLE DIASTOLIC VOLUME INDEX: 81.73 CM3/M2
LEFT VENTRICLE DIASTOLIC VOLUME: 170 CM3
LEFT VENTRICLE SYSTOLIC VOLUME INDEX: 31.25 CM3/M2
LEFT VENTRICLE SYSTOLIC VOLUME: 65 CM3
LEFT VENTRICULAR INTERNAL DIMENSION IN DIASTOLE: 5 CM (ref 5.28–7.33)
LEFT VENTRICULAR POSTERIOR WALL IN END DIASTOLE: 1.1 CM (ref 0.68–1.26)
LV DIASTOLIC VOLUME: 152 CM3
LV ESV (APICAL 2 CHAMBER): 54.7 CM3
LVAD-AP2: 41.5 CM2
LVAD-AP4: 43.9 CM2
LVAS-AP2: 22.5 CM2
LVAS-AP4: 24.1 CM2
LVEDVI(A2C): 73.08 CM3/M2
LVEDVI(BP): 78.37 CM3/M2
LVESVI(A2C): 26.3 CM3/M2
LVESVI(BP): 29.76 CM3/M2
LVLD-AP2: 9.39 CM
LVLD-AP4: 9.16 CM
LVLS-AP2: 7.97 CM
LVLS-AP4: 7.36 CM
LVOT 2D: 2.3 CM
LVOT A: 4.15 CM2
LVOT MG: 4 MMHG
LVOT MV: 0.93 M/S
LVOT PEAK VELOCITY: 1.35 M/S
LVOT PG: 7 MMHG
LVOT STROKE VOLUME INDEX: 69.28 ML/M2
LVOT VTI: 34.7 CM
MLH CV ECHO AVA INDEX VELOCITY RATIO: 0.7
MV E'TISSUE VEL-LAT: 0.08 M/S
MV E'TISSUE VEL-MED: 0.06 M/S
MV PEAK A VEL: 0.6 M/S
MV PEAK E VEL: 0.67 M/S
MV STENOSIS PRESSURE HALF TIME: 89 MS
MV VALVE AREA P 1/2 METHOD: 2.47 CM2
P AXIS: 34
PISA AR MAX VEL: 4.26 M/S
POSTERIOR WALL: 1.1 CM
PR INTERVAL: 216
PULMONARY REGURGITATION LATE DIASTOLIC VELOCITY: 0.94 M/S
PV PEAK GRADIENT: 8 MMHG
PV PV: 1.37 M/S
QRS DURATION: 108
QT INTERVAL: 452
QTC CALCULATION(BAZETT): 443
R AXIS: -65
RAP: 3 MMHG
RVOT VMAX: 0.43 M/S
SEPTAL TISSUE DOPPLER FREE WALL LATE DIA VELOCITY (APICAL 4 CHAMBER VIEW): 0.1 M/S
T WAVE AXIS: 24
TR MAX PG: 27.67 MMHG
TRICUSPID VALVE PEAK REGURGITATION VELOCITY: 2.63 M/S
VENTRICULAR RATE: 58
Z-SCORE OF LEFT VENTRICULAR DIMENSION IN END DIASTOLE: -2.18
Z-SCORE OF LEFT VENTRICULAR DIMENSION IN END SYSTOLE: -1.13
Z-SCORE OF LEFT VENTRICULAR POSTERIOR WALL IN END DIASTOLE: 0.92

## 2025-08-08 PROCEDURE — 99205 OFFICE O/P NEW HI 60 MIN: CPT | Performed by: INTERNAL MEDICINE

## 2025-08-08 PROCEDURE — G8754 DIAS BP LESS 90: HCPCS | Performed by: INTERNAL MEDICINE

## 2025-08-08 PROCEDURE — G8753 SYS BP > OR = 140: HCPCS | Performed by: INTERNAL MEDICINE

## 2025-08-08 PROCEDURE — 93000 ELECTROCARDIOGRAM COMPLETE: CPT | Performed by: INTERNAL MEDICINE

## 2025-08-08 PROCEDURE — 93306 TTE W/DOPPLER COMPLETE: CPT | Performed by: INTERNAL MEDICINE

## 2025-08-08 ASSESSMENT — ENCOUNTER SYMPTOMS
DYSPNEA ON EXERTION: 0
PALPITATIONS: 0
SYNCOPE: 0
LIGHT-HEADEDNESS: 0
NEAR-SYNCOPE: 0
DIZZINESS: 0

## 2025-08-08 NOTE — ASSESSMENT & PLAN NOTE
Pt is planned for Hernia repair on 8/21/25 with Dr. Thibodeaux   Pt has no active cardiopulmonary symptoms  Pt has good functional capacity, able to walk 2 blocks or climb a flight of stairs without cardiopulmonary limitation  Using the [facility]icas risk stratification tool, pt has less than 1% risk of major adverse cardiac event (variables of age, creatinine <1.5, asa class 2, independent functional status, planned surgery)    Pt is an appropriate candidate for surgery from our perspective

## 2025-08-08 NOTE — ASSESSMENT & PLAN NOTE
Non-specific ECG finding  Low suspicion for old infarct given absence of sx's  There is a positive R wave deflection in II/III/AVF and so calling this a q-wave and an old infarct may not be accurate  That said, will confirm or refute finding with TTE imaging but low suspicion that this represents an old infarct

## 2025-08-08 NOTE — ASSESSMENT & PLAN NOTE
Moderate AS, mild-mod AI  Advised close f/u  He left without making a f/u appt despite my strong recommendation to f/u closely for this and additional cardiac care

## 2025-08-08 NOTE — ASSESSMENT & PLAN NOTE
BP adequately controlled for the pre-operative setting. Longitudinal management per primary care  Pt takes amlodipine 5 mg daily which can be continued perioperatively  We will manage BP post-operatively while in the hospital  Longitudinally, BP elevated and LVH criteria on ECG. Likely needs better long-term BP regimen

## 2025-08-21 ENCOUNTER — HOSPITAL ENCOUNTER (OUTPATIENT)
Facility: HOSPITAL | Age: 70
Setting detail: HOSPITAL OUTPATIENT SURGERY
Discharge: HOME | End: 2025-08-21
Attending: SURGERY | Admitting: SURGERY
Payer: MEDICARE

## 2025-08-21 ENCOUNTER — ANESTHESIA EVENT (OUTPATIENT)
Dept: OPERATING ROOM | Facility: HOSPITAL | Age: 70
Setting detail: HOSPITAL OUTPATIENT SURGERY
End: 2025-08-21
Payer: MEDICARE

## 2025-08-21 VITALS
HEART RATE: 63 BPM | OXYGEN SATURATION: 96 % | DIASTOLIC BLOOD PRESSURE: 67 MMHG | TEMPERATURE: 97.2 F | RESPIRATION RATE: 18 BRPM | SYSTOLIC BLOOD PRESSURE: 143 MMHG

## 2025-08-21 DIAGNOSIS — K40.20 NON-RECURRENT BILATERAL INGUINAL HERNIA WITHOUT OBSTRUCTION OR GANGRENE: ICD-10-CM

## 2025-08-21 DIAGNOSIS — Z98.890 STATUS POST BILATERAL INGUINAL HERNIA REPAIR: Primary | ICD-10-CM

## 2025-08-21 DIAGNOSIS — Z87.19 STATUS POST BILATERAL INGUINAL HERNIA REPAIR: Primary | ICD-10-CM

## 2025-08-21 PROCEDURE — 8E0W4CZ ROBOTIC ASSISTED PROCEDURE OF TRUNK REGION, PERCUTANEOUS ENDOSCOPIC APPROACH: ICD-10-PCS | Performed by: SURGERY

## 2025-08-21 PROCEDURE — 71000011 HC PACU PHASE 1 EA ADDL MIN: Performed by: SURGERY

## 2025-08-21 PROCEDURE — 36000016 HC OR LEVEL 6 EA ADDL MIN: Performed by: SURGERY

## 2025-08-21 PROCEDURE — 49651 LAP ING HERNIA REPAIR RECUR: CPT | Mod: RT | Performed by: SURGERY

## 2025-08-21 PROCEDURE — 37000001 HC ANESTHESIA GENERAL: Performed by: SURGERY

## 2025-08-21 PROCEDURE — 25000000 HC PHARMACY GENERAL: Performed by: SURGERY

## 2025-08-21 PROCEDURE — 71000002 HC PACU PHASE 2 INITIAL 30MIN: Performed by: SURGERY

## 2025-08-21 PROCEDURE — 49650 LAP ING HERNIA REPAIR INIT: CPT | Mod: XS,LT | Performed by: SURGERY

## 2025-08-21 PROCEDURE — 63600000 HC DRUGS/DETAIL CODE: Mod: JZ | Performed by: NURSE ANESTHETIST, CERTIFIED REGISTERED

## 2025-08-21 PROCEDURE — 0YUA4JZ SUPPLEMENT BILATERAL INGUINAL REGION WITH SYNTHETIC SUBSTITUTE, PERCUTANEOUS ENDOSCOPIC APPROACH: ICD-10-PCS | Performed by: SURGERY

## 2025-08-21 PROCEDURE — 71000001 HC PACU PHASE 1 INITIAL 30MIN: Performed by: SURGERY

## 2025-08-21 PROCEDURE — 63600000 HC DRUGS/DETAIL CODE: Performed by: ANESTHESIOLOGY

## 2025-08-21 PROCEDURE — 36000006 HC OR LEVEL 6 INITIAL 30MIN: Performed by: SURGERY

## 2025-08-21 PROCEDURE — 71000012 HC PACU PHASE 2 EA ADDL MIN: Performed by: SURGERY

## 2025-08-21 PROCEDURE — 27200000 HC STERILE SUPPLY: Performed by: SURGERY

## 2025-08-21 PROCEDURE — 88304 TISSUE EXAM BY PATHOLOGIST: CPT | Performed by: SURGERY

## 2025-08-21 PROCEDURE — 63600000 HC DRUGS/DETAIL CODE: Mod: JZ | Performed by: SURGERY

## 2025-08-21 PROCEDURE — C1781 MESH (IMPLANTABLE): HCPCS | Performed by: SURGERY

## 2025-08-21 PROCEDURE — 25000000 HC PHARMACY GENERAL: Performed by: ANESTHESIOLOGY

## 2025-08-21 DEVICE — MESH FLAT 16 X 12: Type: IMPLANTABLE DEVICE | Site: GROIN | Status: FUNCTIONAL

## 2025-08-21 DEVICE — MESH SURGIPRO LARGE PLUG/PATCH: Type: IMPLANTABLE DEVICE | Site: GROIN | Status: FUNCTIONAL

## 2025-08-21 RX ORDER — OXYCODONE HYDROCHLORIDE 5 MG/1
5 TABLET ORAL ONCE AS NEEDED
Status: DISCONTINUED | OUTPATIENT
Start: 2025-08-21 | End: 2025-08-21 | Stop reason: HOSPADM

## 2025-08-21 RX ORDER — PHENYLEPHRINE HYDROCHLORIDE 10 MG/ML
INJECTION INTRAVENOUS AS NEEDED
Status: DISCONTINUED | OUTPATIENT
Start: 2025-08-21 | End: 2025-08-21 | Stop reason: SURG

## 2025-08-21 RX ORDER — DEXAMETHASONE SODIUM PHOSPHATE 4 MG/ML
INJECTION, SOLUTION INTRA-ARTICULAR; INTRALESIONAL; INTRAMUSCULAR; INTRAVENOUS; SOFT TISSUE AS NEEDED
Status: DISCONTINUED | OUTPATIENT
Start: 2025-08-21 | End: 2025-08-21 | Stop reason: SURG

## 2025-08-21 RX ORDER — OXYCODONE AND ACETAMINOPHEN 5; 325 MG/1; MG/1
1 TABLET ORAL EVERY 4 HOURS PRN
Qty: 20 TABLET | Refills: 0 | Status: SHIPPED | OUTPATIENT
Start: 2025-08-21

## 2025-08-21 RX ORDER — MIDAZOLAM HYDROCHLORIDE 2 MG/2ML
INJECTION, SOLUTION INTRAMUSCULAR; INTRAVENOUS AS NEEDED
Status: DISCONTINUED | OUTPATIENT
Start: 2025-08-21 | End: 2025-08-21 | Stop reason: SURG

## 2025-08-21 RX ORDER — ONDANSETRON HYDROCHLORIDE 2 MG/ML
4 INJECTION, SOLUTION INTRAVENOUS
Status: DISCONTINUED | OUTPATIENT
Start: 2025-08-21 | End: 2025-08-21 | Stop reason: HOSPADM

## 2025-08-21 RX ORDER — LIDOCAINE HCL/PF 100 MG/5ML
SYRINGE (ML) INTRAVENOUS AS NEEDED
Status: DISCONTINUED | OUTPATIENT
Start: 2025-08-21 | End: 2025-08-21 | Stop reason: SURG

## 2025-08-21 RX ORDER — ACETAMINOPHEN 325 MG/1
650 TABLET ORAL ONCE
Status: DISCONTINUED | OUTPATIENT
Start: 2025-08-21 | End: 2025-08-21 | Stop reason: HOSPADM

## 2025-08-21 RX ORDER — BUPIVACAINE HYDROCHLORIDE AND EPINEPHRINE 2.5; 5 MG/ML; UG/ML
INJECTION, SOLUTION EPIDURAL; INFILTRATION; INTRACAUDAL; PERINEURAL
Status: DISCONTINUED | OUTPATIENT
Start: 2025-08-21 | End: 2025-08-21 | Stop reason: HOSPADM

## 2025-08-21 RX ORDER — PROPOFOL 10 MG/ML
INJECTION, EMULSION INTRAVENOUS AS NEEDED
Status: DISCONTINUED | OUTPATIENT
Start: 2025-08-21 | End: 2025-08-21 | Stop reason: SURG

## 2025-08-21 RX ORDER — FENTANYL CITRATE 50 UG/ML
INJECTION, SOLUTION INTRAMUSCULAR; INTRAVENOUS AS NEEDED
Status: DISCONTINUED | OUTPATIENT
Start: 2025-08-21 | End: 2025-08-21 | Stop reason: SURG

## 2025-08-21 RX ORDER — ROCURONIUM BROMIDE 10 MG/ML
INJECTION, SOLUTION INTRAVENOUS AS NEEDED
Status: DISCONTINUED | OUTPATIENT
Start: 2025-08-21 | End: 2025-08-21 | Stop reason: SURG

## 2025-08-21 RX ORDER — SODIUM CHLORIDE, SODIUM LACTATE, POTASSIUM CHLORIDE, CALCIUM CHLORIDE 600; 310; 30; 20 MG/100ML; MG/100ML; MG/100ML; MG/100ML
INJECTION, SOLUTION INTRAVENOUS CONTINUOUS PRN
Status: DISCONTINUED | OUTPATIENT
Start: 2025-08-21 | End: 2025-08-21 | Stop reason: SURG

## 2025-08-21 RX ORDER — HYDROMORPHONE HYDROCHLORIDE 1 MG/ML
0.5 INJECTION, SOLUTION INTRAMUSCULAR; INTRAVENOUS; SUBCUTANEOUS
Status: ACTIVE | OUTPATIENT
Start: 2025-08-21 | End: 2025-08-21

## 2025-08-21 RX ORDER — FENTANYL CITRATE 50 UG/ML
50 INJECTION, SOLUTION INTRAMUSCULAR; INTRAVENOUS EVERY 5 MIN PRN
Status: DISCONTINUED | OUTPATIENT
Start: 2025-08-21 | End: 2025-08-21 | Stop reason: HOSPADM

## 2025-08-21 RX ADMIN — SODIUM CHLORIDE, POTASSIUM CHLORIDE, SODIUM LACTATE AND CALCIUM CHLORIDE: 600; 310; 30; 20 INJECTION, SOLUTION INTRAVENOUS at 07:02

## 2025-08-21 RX ADMIN — PHENYLEPHRINE HYDROCHLORIDE 100 MCG: 10 INJECTION INTRAVENOUS at 07:52

## 2025-08-21 RX ADMIN — LIDOCAINE HYDROCHLORIDE 100 MG: 20 INJECTION, SOLUTION INTRAVENOUS at 07:50

## 2025-08-21 RX ADMIN — MIDAZOLAM HYDROCHLORIDE 2 MG: 1 INJECTION, SOLUTION INTRAMUSCULAR; INTRAVENOUS at 07:27

## 2025-08-21 RX ADMIN — DEXAMETHASONE SODIUM PHOSPHATE 8 MG: 4 INJECTION, SOLUTION INTRAMUSCULAR; INTRAVENOUS at 08:05

## 2025-08-21 RX ADMIN — PHENYLEPHRINE HYDROCHLORIDE 100 MCG: 10 INJECTION INTRAVENOUS at 08:58

## 2025-08-21 RX ADMIN — FENTANYL CITRATE 50 MCG: 50 INJECTION, SOLUTION INTRAMUSCULAR; INTRAVENOUS at 08:49

## 2025-08-21 RX ADMIN — ROCURONIUM BROMIDE 50 MG: 10 INJECTION, SOLUTION INTRAVENOUS at 07:50

## 2025-08-21 RX ADMIN — PROPOFOL 100 MG: 10 INJECTION, EMULSION INTRAVENOUS at 07:50

## 2025-08-21 RX ADMIN — FENTANYL CITRATE 100 MCG: 50 INJECTION, SOLUTION INTRAMUSCULAR; INTRAVENOUS at 07:50

## 2025-08-21 RX ADMIN — PHENYLEPHRINE HYDROCHLORIDE 100 MCG: 10 INJECTION INTRAVENOUS at 07:50

## 2025-08-21 RX ADMIN — CEFAZOLIN 2 G: 2 INJECTION, POWDER, FOR SOLUTION INTRAMUSCULAR; INTRAVENOUS at 07:30

## 2025-08-21 RX ADMIN — PHENYLEPHRINE HYDROCHLORIDE 100 MCG: 10 INJECTION INTRAVENOUS at 07:57

## 2025-08-21 RX ADMIN — FENTANYL CITRATE 50 MCG: 50 INJECTION, SOLUTION INTRAMUSCULAR; INTRAVENOUS at 09:23

## 2025-08-22 LAB
CASE RPRT: NORMAL
CLINICAL INFO: NORMAL
PATH REPORT.FINAL DX SPEC: NORMAL
PATH REPORT.GROSS SPEC: NORMAL

## 2025-09-03 ENCOUNTER — OFFICE VISIT (OUTPATIENT)
Dept: SURGERY | Facility: CLINIC | Age: 70
End: 2025-09-03
Payer: MEDICARE

## 2025-09-03 VITALS
SYSTOLIC BLOOD PRESSURE: 114 MMHG | TEMPERATURE: 98.1 F | BODY MASS INDEX: 27.41 KG/M2 | DIASTOLIC BLOOD PRESSURE: 70 MMHG | HEART RATE: 61 BPM | OXYGEN SATURATION: 98 % | WEIGHT: 191 LBS

## 2025-09-03 DIAGNOSIS — Z09 POSTOP CHECK: Primary | ICD-10-CM

## 2025-09-03 PROCEDURE — 99024 POSTOP FOLLOW-UP VISIT: CPT | Performed by: SURGERY

## (undated) DEVICE — SUTURE MONOCRYL 4-0 PS-1 Y682H

## (undated) DEVICE — ADHESIVE GAUZE 2" X 2" DRESSING

## (undated) DEVICE — SHIELD EYE UNIVERSAL

## (undated) DEVICE — SOLUTION ELECTROLUBE ANTI-STICK

## (undated) DEVICE — APPLICATOR COTTON-TIP WOOD 6 STRL

## (undated) DEVICE — SOLN IRRIG .9%SOD 1000ML

## (undated) DEVICE — 25G ILLUM LASER PROBE

## (undated) DEVICE — RETRIEVAL SPECIMEN INZII 5MM

## (undated) DEVICE — SYSTEM VISUALIZATION CLEARIFY

## (undated) DEVICE — SEAL UNIVERSAL XI/X

## (undated) DEVICE — SYRINGE DISP LUER-LOK 10 CC

## (undated) DEVICE — PAD GROUND ELECTROSURGICAL W/CORD

## (undated) DEVICE — EVACUATOR PLUME-AWAY LAP SMOKE PKG

## (undated) DEVICE — MANIFOLD FOUR PORT NEPTUNE

## (undated) DEVICE — TUBE INSUFFLATION OPEN CONNECTION

## (undated) DEVICE — SOLN IRRIG .9%SOD 500ML

## (undated) DEVICE — MARKER SURGICAL SKIN

## (undated) DEVICE — COVER MAYO STAND XLARGE 30¿ X 57¿

## (undated) DEVICE — XI OPTICAL BLADLESS OBTURATOR 8MM

## (undated) DEVICE — POUCH INSTRUMENT 7X11 3-4

## (undated) DEVICE — PACK CUSTOM RETINAL VITRECTOMY

## (undated) DEVICE — SPONGE LAP 18X18 SAFE-T RFID ENHANCED XRAY

## (undated) DEVICE — NEEDLE ENT 27 X 1 1/2IN

## (undated) DEVICE — STRAP POSITIONING 5X72" ONE PIECE DISP

## (undated) DEVICE — BSS PLUS 500ML BAG SOLUTION

## (undated) DEVICE — GAUZE 4X4 16 PLY RFID DOUBLE XRAY

## (undated) DEVICE — SUTURE BIOSYN 4-0 UNDYED 1X18 P-14

## (undated) DEVICE — GAUZE STERILE 4X4 16PLY

## (undated) DEVICE — DRAPE 3/4 REINFORCED 53X77 20/CS

## (undated) DEVICE — SEPPS BENZOIN TINCTURE

## (undated) DEVICE — APPLICATOR DUPLOSPRAY ENDO 40CM

## (undated) DEVICE — DRAPE ARM DAVINCI XI

## (undated) DEVICE — GOWN SIRUS NONRNF RAGLAN XL ST 30/CS

## (undated) DEVICE — GLOVE PROTEXIS PI ORTHO 8.5

## (undated) DEVICE — GLOVES 7.5 GAMMEX NON LATEX/SENS BEADED

## (undated) DEVICE — TRAY URINE METER FOLEY NON-SILVER

## (undated) DEVICE — APPLICATOR CHLORAPREP 26ML ORANGE TINT

## (undated) DEVICE — SUTURE STRATAFIX 2-0 30CM SPIRAL MONOCRYL PLUS UNDYED SH

## (undated) DEVICE — KIT ANTI FOG W/SPONGE

## (undated) DEVICE — ENDOWRIST SUCTION/IRRIGATOR 8MM

## (undated) DEVICE — SHEARS TIP COVER DAVINCI ONE USE

## (undated) DEVICE — GLOVE PROTEXIS PI ORTHO 8.0

## (undated) DEVICE — SYRINGE 10CC CONTROL LL

## (undated) DEVICE — VISCOAT VISCOELASTIC SOLUTION

## (undated) DEVICE — TISSEEL PRIMA RTU KIT 4ML

## (undated) DEVICE — 25G PAK FOR CONSTELLATION RETINA

## (undated) DEVICE — NEEDLE/VERRES 120MM    PN120

## (undated) DEVICE — TUBING PLUM PORT ACTIVE SMOKE EVAC

## (undated) DEVICE — GLOVE PROTEXIS PI ORTHO 7.0

## (undated) DEVICE — TOWEL SURGICAL W17XL27IN BLUE COTTON STANDARD PREWASHED DELI

## (undated) DEVICE — SET UP PACK RH

## (undated) DEVICE — NEEDLE INSUFFLATION 120MM

## (undated) DEVICE — NEEDLE BOX CONVENIENCE KIT

## (undated) DEVICE — SUTURE PLAIN 6-0 GUT UNDYED 1X18 SE-140-8

## (undated) DEVICE — SOL BSS 15ML BOTTLE

## (undated) DEVICE — SUTURE STRATAFIX 2-0 20 CM

## (undated) DEVICE — SET LENS BIOM SUPRVIEW PACK

## (undated) DEVICE — NEEDLE  HYPODERMIC NEEDLE-PRO EDGE SAFETY DEVICE 22G X 1 1/

## (undated) DEVICE — CANNULA 25G SOFT TIP

## (undated) DEVICE — TUBING INSUFFLATION PNEUMOSURE HEATED HIGH FLOW

## (undated) DEVICE — PACK UNIVERSAL ULTRAGARD 5/CS

## (undated) DEVICE — LABEL EYES KITS

## (undated) DEVICE — BLADE PROTECTED SIZE 11

## (undated) DEVICE — PAD POSITIONING XL

## (undated) DEVICE — GOWN SURG STRL W/TWL XLG

## (undated) DEVICE — SUTURE POLYSORB 7-0 VIOLET 1X12 SE-140-8 D/A

## (undated) DEVICE — ADHESIVE SKIN DERMABOND ADVANCED 0.7ML

## (undated) DEVICE — PACK UNIVERSAL

## (undated) DEVICE — DRAPE C-ARM X-RAY EQUIPMENT IMAGE

## (undated) DEVICE — HANDLE LIGHT COVER STERILE

## (undated) DEVICE — COVER LIGHTHANDLE

## (undated) DEVICE — SEAL UNIVERSAL 5MM-8MM XI

## (undated) DEVICE — DRAPE PT ISOLATION

## (undated) DEVICE — SOLN IRRIG STER WATER 1000ML

## (undated) DEVICE — STERISTRIP 1/4INX 4IN